# Patient Record
Sex: MALE | Race: OTHER | Employment: FULL TIME | URBAN - NONMETROPOLITAN AREA
[De-identification: names, ages, dates, MRNs, and addresses within clinical notes are randomized per-mention and may not be internally consistent; named-entity substitution may affect disease eponyms.]

---

## 2017-09-22 ENCOUNTER — HOSPITAL ENCOUNTER (OUTPATIENT)
Age: 32
Discharge: HOME | End: 2017-09-22

## 2017-09-22 ENCOUNTER — OFFICE VISIT (OUTPATIENT)
Dept: FAMILY MEDICINE CLINIC | Age: 32
End: 2017-09-22

## 2017-09-22 ENCOUNTER — HOSPITAL ENCOUNTER (OUTPATIENT)
Dept: GENERAL RADIOLOGY | Age: 32
Discharge: HOME | End: 2017-09-22

## 2017-09-22 VITALS
HEIGHT: 66 IN | WEIGHT: 240 LBS | DIASTOLIC BLOOD PRESSURE: 86 MMHG | SYSTOLIC BLOOD PRESSURE: 132 MMHG | BODY MASS INDEX: 38.57 KG/M2

## 2017-09-22 DIAGNOSIS — M25.552 LEFT HIP PAIN: ICD-10-CM

## 2017-09-22 DIAGNOSIS — M25.552 LEFT HIP PAIN: Primary | ICD-10-CM

## 2017-09-22 PROCEDURE — 73502 X-RAY EXAM HIP UNI 2-3 VIEWS: CPT

## 2017-09-22 PROCEDURE — 99202 OFFICE O/P NEW SF 15 MIN: CPT | Performed by: FAMILY MEDICINE

## 2017-09-22 ASSESSMENT — PATIENT HEALTH QUESTIONNAIRE - PHQ9
1. LITTLE INTEREST OR PLEASURE IN DOING THINGS: 0
2. FEELING DOWN, DEPRESSED OR HOPELESS: 0
SUM OF ALL RESPONSES TO PHQ9 QUESTIONS 1 & 2: 0
SUM OF ALL RESPONSES TO PHQ QUESTIONS 1-9: 0

## 2017-09-22 ASSESSMENT — ENCOUNTER SYMPTOMS
GASTROINTESTINAL NEGATIVE: 1
RESPIRATORY NEGATIVE: 1

## 2017-09-25 ENCOUNTER — TELEPHONE (OUTPATIENT)
Dept: FAMILY MEDICINE CLINIC | Age: 32
End: 2017-09-25

## 2017-09-25 DIAGNOSIS — Z87.39 HISTORY OF SLIPPED CAPITAL FEMORAL EPIPHYSIS (SCFE): ICD-10-CM

## 2017-09-25 DIAGNOSIS — M25.552 LEFT HIP PAIN: Primary | ICD-10-CM

## 2019-11-01 ENCOUNTER — APPOINTMENT (OUTPATIENT)
Dept: GENERAL RADIOLOGY | Age: 34
End: 2019-11-01

## 2019-11-01 ENCOUNTER — HOSPITAL ENCOUNTER (EMERGENCY)
Age: 34
Discharge: HOME | End: 2019-11-01
Attending: FAMILY MEDICINE

## 2019-11-01 VITALS
TEMPERATURE: 97.7 F | RESPIRATION RATE: 16 BRPM | HEART RATE: 62 BPM | DIASTOLIC BLOOD PRESSURE: 80 MMHG | WEIGHT: 236 LBS | BODY MASS INDEX: 39.32 KG/M2 | OXYGEN SATURATION: 100 % | HEIGHT: 65 IN | SYSTOLIC BLOOD PRESSURE: 169 MMHG

## 2019-11-01 DIAGNOSIS — M25.552 LEFT HIP PAIN: Primary | ICD-10-CM

## 2019-11-01 DIAGNOSIS — S70.02XA CONTUSION OF LEFT HIP, INITIAL ENCOUNTER: ICD-10-CM

## 2019-11-01 PROCEDURE — 99284 EMERGENCY DEPT VISIT MOD MDM: CPT

## 2019-11-01 PROCEDURE — 73502 X-RAY EXAM HIP UNI 2-3 VIEWS: CPT

## 2019-11-01 RX ORDER — NAPROXEN 500 MG/1
500 TABLET ORAL 2 TIMES DAILY
Qty: 20 TABLET | Refills: 1 | Status: SHIPPED | OUTPATIENT
Start: 2019-11-01 | End: 2020-03-15

## 2019-11-01 ASSESSMENT — PAIN DESCRIPTION - ORIENTATION: ORIENTATION: LEFT

## 2019-11-01 ASSESSMENT — PAIN DESCRIPTION - FREQUENCY: FREQUENCY: CONTINUOUS

## 2019-11-01 ASSESSMENT — PAIN SCALES - GENERAL: PAINLEVEL_OUTOF10: 8

## 2019-11-01 ASSESSMENT — PAIN DESCRIPTION - PAIN TYPE: TYPE: ACUTE PAIN

## 2019-11-01 ASSESSMENT — PAIN DESCRIPTION - ONSET: ONSET: ON-GOING

## 2019-11-01 ASSESSMENT — PAIN DESCRIPTION - LOCATION: LOCATION: HIP

## 2019-11-01 ASSESSMENT — PAIN DESCRIPTION - DESCRIPTORS: DESCRIPTORS: ACHING

## 2020-03-15 ENCOUNTER — HOSPITAL ENCOUNTER (EMERGENCY)
Age: 35
Discharge: HOME | End: 2020-03-15
Attending: EMERGENCY MEDICINE
Payer: COMMERCIAL

## 2020-03-15 ENCOUNTER — APPOINTMENT (OUTPATIENT)
Dept: GENERAL RADIOLOGY | Age: 35
End: 2020-03-15
Payer: COMMERCIAL

## 2020-03-15 VITALS
RESPIRATION RATE: 16 BRPM | TEMPERATURE: 98.8 F | OXYGEN SATURATION: 100 % | HEART RATE: 80 BPM | WEIGHT: 239 LBS | BODY MASS INDEX: 38.41 KG/M2 | HEIGHT: 66 IN | SYSTOLIC BLOOD PRESSURE: 148 MMHG | DIASTOLIC BLOOD PRESSURE: 74 MMHG

## 2020-03-15 LAB
-: NORMAL
ABSOLUTE EOS #: 0.1 K/UL (ref 0–0.4)
ABSOLUTE IMMATURE GRANULOCYTE: ABNORMAL K/UL (ref 0–0.3)
ABSOLUTE LYMPH #: 0.6 K/UL (ref 1–4.8)
ABSOLUTE MONO #: 0.6 K/UL (ref 0–1)
ALBUMIN SERPL-MCNC: 4.5 G/DL (ref 3.5–5.2)
ALBUMIN/GLOBULIN RATIO: ABNORMAL (ref 1–2.5)
ALP BLD-CCNC: 89 U/L (ref 40–129)
ALT SERPL-CCNC: 31 U/L (ref 5–41)
AMORPHOUS: NORMAL
ANION GAP SERPL CALCULATED.3IONS-SCNC: 13 MMOL/L (ref 9–17)
AST SERPL-CCNC: 20 U/L
BACTERIA: NORMAL
BASOPHILS # BLD: 0 % (ref 0–2)
BASOPHILS ABSOLUTE: 0 K/UL (ref 0–0.2)
BILIRUB SERPL-MCNC: 0.34 MG/DL (ref 0.3–1.2)
BILIRUBIN URINE: NEGATIVE
BUN BLDV-MCNC: 10 MG/DL (ref 6–20)
BUN/CREAT BLD: 15 (ref 9–20)
CALCIUM SERPL-MCNC: 9.6 MG/DL (ref 8.6–10.4)
CASTS UA: NORMAL /LPF
CHLORIDE BLD-SCNC: 101 MMOL/L (ref 98–107)
CO2: 22 MMOL/L (ref 20–31)
COLOR: YELLOW
COMMENT UA: ABNORMAL
CREAT SERPL-MCNC: 0.66 MG/DL (ref 0.7–1.2)
CRYSTALS, UA: NORMAL /HPF
DIFFERENTIAL TYPE: YES
DIRECT EXAM: NORMAL
DIRECT EXAM: NORMAL
EOSINOPHILS RELATIVE PERCENT: 1 % (ref 0–5)
EPITHELIAL CELLS UA: NORMAL /HPF
GFR AFRICAN AMERICAN: >60 ML/MIN
GFR NON-AFRICAN AMERICAN: >60 ML/MIN
GFR SERPL CREATININE-BSD FRML MDRD: ABNORMAL ML/MIN/{1.73_M2}
GFR SERPL CREATININE-BSD FRML MDRD: ABNORMAL ML/MIN/{1.73_M2}
GLUCOSE BLD-MCNC: 132 MG/DL (ref 70–99)
GLUCOSE URINE: NEGATIVE
HCT VFR BLD CALC: 48.5 % (ref 41–53)
HEMOGLOBIN: 16.3 G/DL (ref 13.5–17.5)
IMMATURE GRANULOCYTES: ABNORMAL %
KETONES, URINE: NEGATIVE
LEUKOCYTE ESTERASE, URINE: NEGATIVE
LIPASE: 18 U/L (ref 13–60)
LYMPHOCYTES # BLD: 8 % (ref 13–44)
Lab: NORMAL
MCH RBC QN AUTO: 30.3 PG (ref 26–34)
MCHC RBC AUTO-ENTMCNC: 33.5 G/DL (ref 31–37)
MCV RBC AUTO: 90.3 FL (ref 80–100)
MONOCYTES # BLD: 8 % (ref 5–9)
MUCUS: NORMAL
NITRITE, URINE: NEGATIVE
NRBC AUTOMATED: ABNORMAL PER 100 WBC
OTHER OBSERVATIONS UA: NORMAL
PDW BLD-RTO: 13 % (ref 12.1–15.2)
PH UA: 7 (ref 5–8)
PLATELET # BLD: 228 K/UL (ref 140–450)
PLATELET ESTIMATE: ABNORMAL
PMV BLD AUTO: ABNORMAL FL (ref 6–12)
POTASSIUM SERPL-SCNC: 4.3 MMOL/L (ref 3.7–5.3)
PROTEIN UA: ABNORMAL
RBC # BLD: 5.37 M/UL (ref 4.5–5.9)
RBC # BLD: ABNORMAL 10*6/UL
RBC UA: NORMAL /HPF (ref 0–2)
RENAL EPITHELIAL, UA: NORMAL /HPF
SEG NEUTROPHILS: 83 % (ref 39–75)
SEGMENTED NEUTROPHILS ABSOLUTE COUNT: 6.9 K/UL (ref 2.1–6.5)
SODIUM BLD-SCNC: 136 MMOL/L (ref 135–144)
SPECIFIC GRAVITY UA: 1.01 (ref 1–1.03)
SPECIMEN DESCRIPTION: NORMAL
TOTAL PROTEIN: 8.2 G/DL (ref 6.4–8.3)
TRICHOMONAS: NORMAL
TROPONIN INTERP: NORMAL
TROPONIN T: <0.03 NG/ML
TROPONIN, HIGH SENSITIVITY: NORMAL NG/L (ref 0–22)
TURBIDITY: CLEAR
URINE HGB: ABNORMAL
UROBILINOGEN, URINE: NORMAL
WBC # BLD: 8.3 K/UL (ref 3.5–11)
WBC # BLD: ABNORMAL 10*3/UL
WBC UA: NORMAL /HPF
YEAST: NORMAL

## 2020-03-15 PROCEDURE — 80053 COMPREHEN METABOLIC PANEL: CPT

## 2020-03-15 PROCEDURE — 71046 X-RAY EXAM CHEST 2 VIEWS: CPT

## 2020-03-15 PROCEDURE — 6360000002 HC RX W HCPCS: Performed by: EMERGENCY MEDICINE

## 2020-03-15 PROCEDURE — 84484 ASSAY OF TROPONIN QUANT: CPT

## 2020-03-15 PROCEDURE — 81001 URINALYSIS AUTO W/SCOPE: CPT

## 2020-03-15 PROCEDURE — 83690 ASSAY OF LIPASE: CPT

## 2020-03-15 PROCEDURE — 87804 INFLUENZA ASSAY W/OPTIC: CPT

## 2020-03-15 PROCEDURE — 36415 COLL VENOUS BLD VENIPUNCTURE: CPT

## 2020-03-15 PROCEDURE — 2580000003 HC RX 258: Performed by: EMERGENCY MEDICINE

## 2020-03-15 PROCEDURE — 96374 THER/PROPH/DIAG INJ IV PUSH: CPT

## 2020-03-15 PROCEDURE — 99285 EMERGENCY DEPT VISIT HI MDM: CPT

## 2020-03-15 PROCEDURE — 85025 COMPLETE CBC W/AUTO DIFF WBC: CPT

## 2020-03-15 RX ORDER — 0.9 % SODIUM CHLORIDE 0.9 %
1000 INTRAVENOUS SOLUTION INTRAVENOUS ONCE
Status: COMPLETED | OUTPATIENT
Start: 2020-03-15 | End: 2020-03-15

## 2020-03-15 RX ORDER — ONDANSETRON 4 MG/1
4 TABLET, ORALLY DISINTEGRATING ORAL EVERY 8 HOURS PRN
Qty: 20 TABLET | Refills: 0 | Status: SHIPPED | OUTPATIENT
Start: 2020-03-15 | End: 2020-03-22

## 2020-03-15 RX ORDER — AZITHROMYCIN 250 MG/1
TABLET, FILM COATED ORAL
Qty: 1 PACKET | Refills: 0 | Status: SHIPPED | OUTPATIENT
Start: 2020-03-15 | End: 2020-03-19

## 2020-03-15 RX ORDER — ONDANSETRON 2 MG/ML
4 INJECTION INTRAMUSCULAR; INTRAVENOUS ONCE
Status: COMPLETED | OUTPATIENT
Start: 2020-03-15 | End: 2020-03-15

## 2020-03-15 RX ORDER — ALBUTEROL SULFATE 90 UG/1
2 AEROSOL, METERED RESPIRATORY (INHALATION) EVERY 6 HOURS PRN
Qty: 1 INHALER | Refills: 3 | Status: SHIPPED | OUTPATIENT
Start: 2020-03-15

## 2020-03-15 RX ADMIN — ONDANSETRON 4 MG: 2 INJECTION INTRAMUSCULAR; INTRAVENOUS at 10:19

## 2020-03-15 RX ADMIN — SODIUM CHLORIDE 1000 ML: 9 INJECTION, SOLUTION INTRAVENOUS at 10:19

## 2020-03-15 ASSESSMENT — PAIN SCALES - GENERAL: PAINLEVEL_OUTOF10: 7

## 2020-03-15 ASSESSMENT — PAIN DESCRIPTION - LOCATION: LOCATION: ABDOMEN;GENERALIZED

## 2020-03-15 ASSESSMENT — PAIN DESCRIPTION - PAIN TYPE: TYPE: ACUTE PAIN

## 2020-03-15 NOTE — LETTER
[facility] ED  [address] [address]  Phone: [phone]               March 15, 2020    Patient: Basil Kate. YOB: 1985   Date of Visit: 3/15/2020       To Whom It May Concern:    Brittany Sosa was seen and treated in our emergency department on 3/15/2020. He may return to work on 18 March 2020.       Sincerely,       Danya Muñiz RN         Signature:__________________________________

## 2020-03-17 NOTE — ED PROVIDER NOTES
----- Message from Lance Cordero sent at 7/5/2018 11:44 AM EDT -----  Regarding: Dr [facility]: [phone]  Anny is calling about the pump order. He is calling to say everything is ready to go ahead.   Please call dad back at:      Anny Cell # [phone] within normal limits   URINALYSIS - Abnormal; Notable for the following components:    Urine Hgb TRACE (*)     Protein, UA TRACE (*)     All other components within normal limits   RAPID INFLUENZA A/B ANTIGENS   LIPASE   TROPONIN   MICROSCOPIC URINALYSIS             Summation      Patient Course:     ED Medications administered this visit:    Medications   0.9 % sodium chloride bolus (0 mLs Intravenous Stopped 3/15/20 1102)   ondansetron (ZOFRAN) injection 4 mg (4 mg Intravenous Given 3/15/20 1019)       New Prescriptions from this visit:    Discharge Medication List as of 3/15/2020 10:42 AM      START taking these medications    Details   ondansetron (ZOFRAN-ODT) 4 MG disintegrating tablet Place 1 tablet under the tongue every 8 hours as needed for Nausea or Vomiting May Sub regular tablet (non-ODT) if insurance does not cover ODT., Disp-20 tablet, R-0Print      albuterol sulfate HFA (PROVENTIL HFA) 108 (90 Base) MCG/ACT inhaler Inhale 2 puffs into the lungs every 6 hours as needed for Wheezing, Disp-1 Inhaler, R-3Print      azithromycin (ZITHROMAX Z-DANAE) 250 MG tablet Take 2 tablets (500 mg) on Day 1, and then take 1 tablet (250 mg) on days 2 through 5., Disp-1 packet, R-0Print             Follow-up:  Brandy Guerrero DO  [address] [phone]    Call in 1 day          Final Impression:   1. Nausea and vomiting, intractability of vomiting not specified, unspecified vomiting type    2. Bronchitis    3.  Smoking               (Please note that portions of this note were completed with a voice recognition program.  Efforts were made to edit the dictations but occasionally words are mis-transcribed.)            Jaylan Randle MD  03/17/20 [id number]

## 2020-09-30 ENCOUNTER — HOSPITAL ENCOUNTER (EMERGENCY)
Age: 35
Discharge: HOME | End: 2020-09-30
Attending: EMERGENCY MEDICINE

## 2020-09-30 VITALS
DIASTOLIC BLOOD PRESSURE: 84 MMHG | OXYGEN SATURATION: 94 % | WEIGHT: 233 LBS | SYSTOLIC BLOOD PRESSURE: 158 MMHG | RESPIRATION RATE: 18 BRPM | BODY MASS INDEX: 38.82 KG/M2 | TEMPERATURE: 97.7 F | HEART RATE: 70 BPM | HEIGHT: 65 IN

## 2020-09-30 PROCEDURE — 99283 EMERGENCY DEPT VISIT LOW MDM: CPT

## 2020-09-30 PROCEDURE — 99282 EMERGENCY DEPT VISIT SF MDM: CPT

## 2020-09-30 ASSESSMENT — PAIN DESCRIPTION - DESCRIPTORS: DESCRIPTORS: ACHING

## 2020-09-30 ASSESSMENT — PAIN SCALES - GENERAL: PAINLEVEL_OUTOF10: 9

## 2020-09-30 ASSESSMENT — PAIN DESCRIPTION - PAIN TYPE: TYPE: ACUTE PAIN

## 2020-09-30 ASSESSMENT — PAIN DESCRIPTION - ORIENTATION: ORIENTATION: LEFT

## 2020-09-30 ASSESSMENT — PAIN DESCRIPTION - LOCATION: LOCATION: LEG

## 2020-09-30 NOTE — LETTER
[facility] ED  [address], [address]  Phone: [phone]               September 30, 2020    Patient: Saran Cancino. YOB: 1985   Date of Visit: 9/30/2020       To Whom It May Concern:    Naomia Opitz was seen and treated in our emergency department on 9/30/2020. He may return to work on 10/02/2020.       Sincerely,       Teto Escobar RN         Signature:__________________________________

## 2020-09-30 NOTE — ED PROVIDER NOTES
eMERGENCY dEPARTMENT eNCOUnter        [address] [facility]    Chief Complaint   Patient presents with    Leg Pain     Pt fell in the shower yesterday morning, pt worked all day yesterday and now having increased pain to left leg. HPI    Angely Harmon is a 36 y.o. male who presents to ED from home. By car. With complaint of left buttock pain with radiation to his lower back. Onset since yesterday. Patient states that he fell in the shower yesterday. Patient was able to ambulate. Patient work for most of his shift yesterday. Patient has pain with walking. Intensity of symptoms moderate pain with walking. Location of symptoms left posterior hip. Patient is able to ambulate. Patient has prior history of left hip surgery. REVIEW OF SYSTEMS    All systems reviewed and positives are in the HPI      PAST MEDICAL HISTORY    History reviewed. No pertinent past medical history. SURGICAL HISTORY    Past Surgical History:   Procedure Laterality Date    CAUTERIZE INNER NOSE Left     HERNIA REPAIR      HIP SURGERY Left     pinning       CURRENT MEDICATIONS    Current Outpatient Rx   Medication Sig Dispense Refill    albuterol sulfate HFA (PROVENTIL HFA) 108 (90 Base) MCG/ACT inhaler Inhale 2 puffs into the lungs every 6 hours as needed for Wheezing 1 Inhaler 3       ALLERGIES    Allergies   Allergen Reactions    Vicodin [Hydrocodone-Acetaminophen] Other (See Comments)     Upset stomach       FAMILY HISTORY    History reviewed. No pertinent family history.     SOCIAL HISTORY    Social History     Socioeconomic History    Marital status: Single     Spouse name: None    Number of children: None    Years of education: None    Highest education level: None   Occupational History    None   Social Needs    Financial resource strain: None    Food insecurity     Worry: None     Inability: None    Transportation needs     Medical: None     Non-medical: None   Tobacco Use    Smoking status: Current Every Day Smoker     Packs/day: 1.00     Types: Cigarettes    Smokeless tobacco: Former User     Types: Chew   Substance and Sexual Activity    Alcohol use: No    Drug use: Not Currently    Sexual activity: None   Lifestyle    Physical activity     Days per week: None     Minutes per session: None    Stress: None   Relationships    Social connections     Talks on phone: None     Gets together: None     Attends Baptism service: None     Active member of club or organization: None     Attends meetings of clubs or organizations: None     Relationship status: None    Intimate partner violence     Fear of current or ex partner: None     Emotionally abused: None     Physically abused: None     Forced sexual activity: None   Other Topics Concern    None   Social History Narrative    ** Merged History Encounter **            PHYSICAL EXAM    VITAL SIGNS: BP (!) 158/84   Pulse 70   Temp 97.7 °F (36.5 °C) (Oral)   Resp 18   Ht 5' 5\" (1.651 m)   Wt 233 lb (105.7 kg)   SpO2 94%   BMI 38.77 kg/m²   Constitutional:  Well developed, well nourished, no acute distress, non-toxic appearance   HENT:  Atraumatic, external ears normal, nose normal, oropharynx moist.  Neck- normal range of motion, no tenderness, supple   Respiratory:  No respiratory distress, normal breath sounds. Cardiovascular:  Normal rate, normal rhythm, no murmurs, no gallops, no rubs   GI:  Soft, nondistended, normal bowel sounds, nontender   Musculoskeletal: Left hip with good range of motion no deformity. No ecchymosis. No swelling. Not warm to touch. Integument:  Well hydrated, no rash   Neurologic: Alert and oriented x3. RADIOLOGY/PROCEDURES    No orders to display         Labs  Labs Reviewed - No data to display          Summation      Patient Course: Patient will be sent home. Rest is recommended. X-rays were discussed with the patient. At this point the patient will be sent home rest is recommended.   Physical activity as tolerated. Off work x2 days. Follow-up with Ortho for persistent symptoms. Return to ED if worse. ED Medications administered this visit:  Medications - No data to display    New Prescriptions from this visit:    New Prescriptions    No medications on file       Follow-up:  [address] [facility]  Schedule an appointment as soon as possible for a visit in 2 days  As needed, If symptoms worsen        Final Impression:   1.  Contusion of left hip, initial encounter               (Please note that portions of this note were completed with a voice recognition program.  Efforts were made to edit the dictations but occasionally words are mis-transcribed.)          Vijay Ontiveros MD  09/30/20 2135

## 2020-10-27 ENCOUNTER — OFFICE VISIT (OUTPATIENT)
Dept: FAMILY MEDICINE CLINIC | Age: 35
End: 2020-10-27
Payer: COMMERCIAL

## 2020-10-27 VITALS
OXYGEN SATURATION: 98 % | HEART RATE: 69 BPM | BODY MASS INDEX: 38.44 KG/M2 | TEMPERATURE: 97.6 F | WEIGHT: 231 LBS | SYSTOLIC BLOOD PRESSURE: 146 MMHG | DIASTOLIC BLOOD PRESSURE: 90 MMHG

## 2020-10-27 PROCEDURE — 99203 OFFICE O/P NEW LOW 30 MIN: CPT | Performed by: INTERNAL MEDICINE

## 2020-10-27 RX ORDER — MELOXICAM 15 MG/1
15 TABLET ORAL DAILY
Qty: 90 TABLET | Refills: 1 | Status: SHIPPED | OUTPATIENT
Start: 2020-10-27

## 2020-10-27 RX ORDER — METHOCARBAMOL 750 MG/1
TABLET, FILM COATED ORAL
COMMUNITY
Start: 2020-10-21

## 2020-10-27 RX ORDER — METHYLPREDNISOLONE 4 MG/1
TABLET ORAL
COMMUNITY
Start: 2020-10-21

## 2020-10-27 SDOH — ECONOMIC STABILITY: FOOD INSECURITY: WITHIN THE PAST 12 MONTHS, THE FOOD YOU BOUGHT JUST DIDN'T LAST AND YOU DIDN'T HAVE MONEY TO GET MORE.: NEVER TRUE

## 2020-10-27 SDOH — ECONOMIC STABILITY: FOOD INSECURITY: WITHIN THE PAST 12 MONTHS, YOU WORRIED THAT YOUR FOOD WOULD RUN OUT BEFORE YOU GOT MONEY TO BUY MORE.: NEVER TRUE

## 2020-10-27 SDOH — ECONOMIC STABILITY: TRANSPORTATION INSECURITY
IN THE PAST 12 MONTHS, HAS LACK OF TRANSPORTATION KEPT YOU FROM MEETINGS, WORK, OR FROM GETTING THINGS NEEDED FOR DAILY LIVING?: NO

## 2020-10-27 SDOH — ECONOMIC STABILITY: INCOME INSECURITY: HOW HARD IS IT FOR YOU TO PAY FOR THE VERY BASICS LIKE FOOD, HOUSING, MEDICAL CARE, AND HEATING?: NOT VERY HARD

## 2020-10-27 SDOH — ECONOMIC STABILITY: TRANSPORTATION INSECURITY
IN THE PAST 12 MONTHS, HAS THE LACK OF TRANSPORTATION KEPT YOU FROM MEDICAL APPOINTMENTS OR FROM GETTING MEDICATIONS?: NO

## 2020-10-27 ASSESSMENT — PATIENT HEALTH QUESTIONNAIRE - PHQ9
SUM OF ALL RESPONSES TO PHQ QUESTIONS 1-9: 0
1. LITTLE INTEREST OR PLEASURE IN DOING THINGS: 0
2. FEELING DOWN, DEPRESSED OR HOPELESS: 0
SUM OF ALL RESPONSES TO PHQ9 QUESTIONS 1 & 2: 0
SUM OF ALL RESPONSES TO PHQ QUESTIONS 1-9: 0
SUM OF ALL RESPONSES TO PHQ QUESTIONS 1-9: 0

## 2020-10-27 ASSESSMENT — ENCOUNTER SYMPTOMS
SORE THROAT: 0
COUGH: 0
CONSTIPATION: 0
SHORTNESS OF BREATH: 0
ABDOMINAL PAIN: 0
ALLERGIC/IMMUNOLOGIC NEGATIVE: 1
NAUSEA: 0
WHEEZING: 0
BACK PAIN: 1
BLOOD IN STOOL: 0

## 2020-10-27 NOTE — PROGRESS NOTES
HPI Notes    Name: Abel Parker. : 1985         Chief Complaint:     Chief Complaint   Patient presents with    Follow-Up from Hospital     Patient presents today for a follow up for ER visit with left leg pain.  New Patient     Patient is new to provider. History of Present Illness:        Mr. Mauricio Troncoso is a new patient. He presents to office for evaluation of chronic pain in the left hip/lower back. He went to [facility] on 10/21/20 due to worsening worsening pain in the left hip. Had x-ray of the left hip revealing: There is old deformity of the left femoral neck, and there is a metallic surgical screw traversing the femoral neck and ending in the femoral head. There is slight narrowing of the left hip joint space superiorly and laterally with minimal marginal hypertrophic lipping. The bones of the left hip are otherwise unremarkable. No recent fracture no dislocation is evident. The bony pelvis is unremarkable. Patient also was in [facility] ER on 20. No imaging studies were done. Was discharged home and advised to take OTC pain meds. Tried Ibuprofen 400 mg twice daily. . Initially it helped, but then it stopped working. No recent falls  Works at [employer] as a . States unable to bend due to the pain in the left hip and lower back area. Has very limited range of motion in the left hip and lower back. Reports  h/o left hip surgery at age 7 or 6 with a placement of a screw. He is not sure of his diagnosis. Since then has not followed up with orthopedic surgery. States has been having intermittent pains in his left hip but they usually resolve. Recently the pain has been more frequent and lasting longer. Hip Pain    The incident occurred more than 1 week ago. The incident occurred at home. There was no injury mechanism. The pain is present in the left hip. The quality of the pain is described as stabbing and aching.  The pain is at a severity of 10/10. The pain has been constant since onset. Associated symptoms include numbness (left leg) and tingling. He reports no foreign bodies present. He has tried NSAIDs, heat and immobilization for the symptoms. The treatment provided mild relief. Past Medical History:     History reviewed. No pertinent past medical history. Reviewed all health maintenance requirements and orderedappropriate tests  Health Maintenance Due   Topic Date Due    Varicella vaccine (1 of 2 - 2-dose childhood series) 12/21/1987    Pneumococcal 0-64 years Vaccine (1 of 1 - PPSV23) 12/21/1992    HIV screen  12/21/2001    DTaP/Tdap/Td vaccine (1 - Tdap) 12/21/2005    Flu vaccine (1) 09/01/2020       Past Surgical History:     Past Surgical History:   Procedure Laterality Date    CAUTERIZE INNER NOSE Left     HERNIA REPAIR      HIP SURGERY Left     pinning        Medications:       Prior to Admission medications    Medication Sig Start Date End Date Taking? Authorizing Provider   methocarbamol (ROBAXIN) 750 MG tablet TAKE 1 TABLET BY MOUTH THREE TIMES DAILY FOR 7 DAYS 10/21/20  Yes Historical Provider, MD   methylPREDNISolone (MEDROL DOSEPACK) 4 MG tablet USE AS DIRECTED ON PACKAGE LABELING 10/21/20  Yes Historical Provider, MD   meloxicam (MOBIC) 15 MG tablet Take 1 tablet by mouth daily 10/27/20  Yes Javier Albarran MD   albuterol sulfate HFA (PROVENTIL HFA) 108 (90 Base) MCG/ACT inhaler Inhale 2 puffs into the lungs every 6 hours as needed for Wheezing  Patient not taking: Reported on 10/27/2020 3/15/20   Rupa Ramsey MD        Allergies:       Vicodin [hydrocodone-acetaminophen]    Social History:     Tobacco: reports that he has been smoking cigarettes. He has been smoking about 1.00 pack per day. He has quit using smokeless tobacco.  His smokeless tobacco use included chew. Alcohol:      reports no history of alcohol use. Drug Use:  reports previous drug use. Family History:     History reviewed.  No pertinent family history. Review of Systems:         Review of Systems   Constitutional: Negative for activity change, appetite change and unexpected weight change. HENT: Negative for congestion, ear discharge, ear pain and sore throat. Eyes: Negative for visual disturbance. Respiratory: Negative for cough, shortness of breath and wheezing. Cardiovascular: Negative for chest pain, palpitations and leg swelling. Gastrointestinal: Negative for abdominal pain, blood in stool, constipation and nausea. Endocrine: Negative for cold intolerance, heat intolerance, polydipsia and polyuria. Genitourinary: Negative for difficulty urinating and dysuria. Musculoskeletal: Positive for arthralgias and back pain. Negative for joint swelling, myalgias and neck pain. Skin: Negative for rash. Allergic/Immunologic: Negative. Neurological: Positive for tingling and numbness (left leg). Negative for dizziness, tremors, weakness and headaches. Psychiatric/Behavioral: Negative for behavioral problems and dysphoric mood. The patient is not nervous/anxious. Physical Exam:     Vitals:  BP (!) 146/90 (Site: Left Upper Arm, Position: Sitting, Cuff Size: Large Adult)   Pulse 69   Temp 97.6 °F (36.4 °C) (Temporal)   Wt 231 lb (104.8 kg)   SpO2 98%   BMI 38.44 kg/m²       Physical Exam  Vitals signs reviewed. Constitutional:       General: He is not in acute distress. Appearance: He is well-developed. He is obese. He is not ill-appearing. HENT:      Head: Normocephalic and atraumatic. Right Ear: Tympanic membrane, ear canal and external ear normal.      Left Ear: Tympanic membrane, ear canal and external ear normal.      Nose: Nose normal. No congestion. Mouth/Throat:      Mouth: Mucous membranes are moist.      Pharynx: Oropharynx is clear. Eyes:      General: No scleral icterus. Right eye: No discharge. Left eye: No discharge.       Conjunctiva/sclera: Conjunctivae normal.   Neck:      Thyroid: No thyromegaly. Cardiovascular:      Rate and Rhythm: Normal rate and regular rhythm. Heart sounds: Normal heart sounds. No murmur. Pulmonary:      Effort: Pulmonary effort is normal.      Breath sounds: Normal breath sounds. No wheezing or rales. Abdominal:      General: Bowel sounds are normal. There is no distension. Palpations: Abdomen is soft. There is no mass. Tenderness: There is no abdominal tenderness. Musculoskeletal: Normal range of motion. Right lower leg: No edema. Left lower leg: No edema. Comments:   Gait is slow but steady. Has tenderness on palpation over the lateral hip area. Unable to bend forward due to experiencing pain in the left hip. No tenderness present on palpation of the lumbosacral area. Lymphadenopathy:      Cervical: No cervical adenopathy. Skin:     General: Skin is warm and dry. Coloration: Skin is not jaundiced or pale. Findings: No rash. Neurological:      General: No focal deficit present. Mental Status: He is alert and oriented to person, place, and time. Mental status is at baseline.       Coordination: Coordination normal.   Psychiatric:         Behavior: Behavior normal.         Judgment: Judgment normal.               Data:     Lab Results   Component Value Date     03/15/2020    K 4.3 03/15/2020     03/15/2020    CO2 22 03/15/2020    BUN 10 03/15/2020    CREATININE 0.66 03/15/2020    GLUCOSE 132 03/15/2020    PROT 8.2 03/15/2020    LABALBU 4.5 03/15/2020    BILITOT 0.34 03/15/2020    ALKPHOS 89 03/15/2020    AST 20 03/15/2020    ALT 31 03/15/2020     Lab Results   Component Value Date    WBC 8.3 03/15/2020    RBC 5.37 03/15/2020    HGB 16.3 03/15/2020    HCT 48.5 03/15/2020    MCV 90.3 03/15/2020    MCH 30.3 03/15/2020    MCHC 33.5 03/15/2020    RDW 13.0 03/15/2020     03/15/2020    MPV NOT REPORTED 03/15/2020     No results found for: TSH  No results found for: CHOL, HDL, PSA, LABA1C       Assessment & Plan        Diagnosis Orders   1. Left hip pain  meloxicam (MOBIC) 15 MG tablet   2. History of hip surgery  meloxicam (MOBIC) 15 MG tablet    External Referral To Orthopedic Surgery     Prescribed meloxicam 15 mg daily. Will refer to orthopedic surgeon for evaluation of possible hardware problems in the left hip and joint instability. Advised to avoid lifting heavy boxes, will provide with work excuse until evaluated by orthopedic surgeon. Completed Refills   Requested Prescriptions     Signed Prescriptions Disp Refills    meloxicam (MOBIC) 15 MG tablet 90 tablet 1     Sig: Take 1 tablet by mouth daily     Return if symptoms worsen or fail to improve. Orders Placed This Encounter   Medications    meloxicam (MOBIC) 15 MG tablet     Sig: Take 1 tablet by mouth daily     Dispense:  90 tablet     Refill:  1     Orders Placed This Encounter   Procedures    External Referral To Orthopedic Surgery     Referral Priority:   Routine     Referral Type:   Eval and Treat     Referral Reason:   Specialty Services Required     Referred to Provider:   Matt Cadena MD     Requested Specialty:   Orthopedic Surgery     Number of Visits Requested:   1         Patient Instructions   SURVEY:    You may be receiving a survey from [employer] regarding your visit today. Please complete the survey to enable us to provide the highest quality of care to you and your family. If you cannot score us a very good on any question, please call the office to discuss how we could of made your experience a very good one. Thank you. You may be receiving a survey from [employer] regarding your visit today. You may get this in the mail, through your MyChart or in your email. Please complete the survey to enable us to provide the highest quality of care to you and your family.     If you cannot score us as very good ( 5 Stars) on any question, please feel free to call the office to discuss how we could have made your experience exceptional.     Thank You!       MD Dixie Thompson RMA  Ignacio Bologna, PCA        Electronically signed by Carole Guajardo MD on 10/27/2020 at 12:35 PM           Completed Refills      Requested Prescriptions     Signed Prescriptions Disp Refills    meloxicam (MOBIC) 15 MG tablet 90 tablet 1     Sig: Take 1 tablet by mouth daily

## 2020-10-27 NOTE — PATIENT INSTRUCTIONS
SURVEY:    You may be receiving a survey from [employer] regarding your visit today. Please complete the survey to enable us to provide the highest quality of care to you and your family. If you cannot score us a very good on any question, please call the office to discuss how we could of made your experience a very good one. Thank you. You may be receiving a survey from [employer] regarding your visit today. You may get this in the mail, through your MyChart or in your email. Please complete the survey to enable us to provide the highest quality of care to you and your family. If you cannot score us as very good ( 5 Stars) on any question, please feel free to call the office to discuss how we could have made your experience exceptional.     Thank You!       MD Dee Hernandez, DAVID Park, PCA

## 2020-10-30 ENCOUNTER — HOSPITAL ENCOUNTER (OUTPATIENT)
Dept: GENERAL RADIOLOGY | Age: 35
Discharge: HOME | End: 2020-11-01
Payer: COMMERCIAL

## 2020-10-30 ENCOUNTER — HOSPITAL ENCOUNTER (OUTPATIENT)
Age: 35
Discharge: HOME | End: 2020-11-01
Payer: COMMERCIAL

## 2020-10-30 PROCEDURE — 72114 X-RAY EXAM L-S SPINE BENDING: CPT

## 2020-10-30 PROCEDURE — 73502 X-RAY EXAM HIP UNI 2-3 VIEWS: CPT

## 2022-06-07 ENCOUNTER — OFFICE VISIT (OUTPATIENT)
Dept: PRIMARY CARE CLINIC | Age: 37
End: 2022-06-07
Payer: COMMERCIAL

## 2022-06-07 VITALS
BODY MASS INDEX: 39.94 KG/M2 | HEART RATE: 66 BPM | DIASTOLIC BLOOD PRESSURE: 82 MMHG | OXYGEN SATURATION: 98 % | WEIGHT: 240 LBS | TEMPERATURE: 98 F | SYSTOLIC BLOOD PRESSURE: 132 MMHG

## 2022-06-07 DIAGNOSIS — R19.7 ABDOMINAL PAIN, VOMITING, AND DIARRHEA: ICD-10-CM

## 2022-06-07 DIAGNOSIS — R11.10 ABDOMINAL PAIN, VOMITING, AND DIARRHEA: ICD-10-CM

## 2022-06-07 DIAGNOSIS — R10.9 ABDOMINAL PAIN, VOMITING, AND DIARRHEA: ICD-10-CM

## 2022-06-07 DIAGNOSIS — K52.9 GASTROENTERITIS: Primary | ICD-10-CM

## 2022-06-07 PROBLEM — F17.200 CURRENT SMOKER: Status: ACTIVE | Noted: 2022-06-07

## 2022-06-07 PROBLEM — K40.90 INGUINAL HERNIA: Status: ACTIVE | Noted: 2022-06-07

## 2022-06-07 LAB
BILIRUBIN, POC: NEGATIVE
BLOOD URINE, POC: NEGATIVE
CLARITY, POC: CLEAR
COLOR, POC: YELLOW
GLUCOSE URINE, POC: NEGATIVE
INFLUENZA A ANTIBODY: NEGATIVE
INFLUENZA B ANTIBODY: NEGATIVE
KETONES, POC: NEGATIVE
LEUKOCYTE EST, POC: NEGATIVE
NITRITE, POC: NEGATIVE
PH, POC: 7
PROTEIN, POC: NEGATIVE
SPECIFIC GRAVITY, POC: 1.02
UROBILINOGEN, POC: 0.2

## 2022-06-07 PROCEDURE — 99213 OFFICE O/P EST LOW 20 MIN: CPT | Performed by: NURSE PRACTITIONER

## 2022-06-07 PROCEDURE — 81002 URINALYSIS NONAUTO W/O SCOPE: CPT | Performed by: NURSE PRACTITIONER

## 2022-06-07 PROCEDURE — 87804 INFLUENZA ASSAY W/OPTIC: CPT | Performed by: NURSE PRACTITIONER

## 2022-06-07 NOTE — PROGRESS NOTES
Chief Complaint:       Nausea & Vomiting (Symptoms started yesterday) and Diarrhea      History of Present Illness   Source of history provided by:  patient. Laura Lu is a 29 y.o. old male who has a past medical history of: No past medical history on file. Presents to the walk in clinic for complaints of nausea and vomiting and diarrhea that began yesterday. According to Ryann Veloz, he is concerned that he may have eaten a bad sandwich at work yesterday. He reports that he consumed a sandwich around 1630 yesterday at work from [employer] that consisted of a hot dog bun, ham and a packet of mayonnaise. He began to feel bad approximately 1-1/2 hours later. According to Ryann Veloz, he had at least 2 emesis yesterday, denies any emesis today and has been able to retain apple juice and water. His last diarrheal stools this morning and reports altogether he has had approximately 6 episodes that awaken him through the night. There is been no blood or mucus in the diarrhea. He has been using over-the-counter antidiarrhea medication with good results. He denies fever, reports he may have had some chilling after the emesis began. Ryann Veloz reports that he has a sore abdomen and feels it is more likely due to sore muscles with emesis. He reports \"my stomach is sore as can be. Feels like it is just muscle pain from a vomiting. Like I did a bunch of crunches. It will start gurgling and cramping to\". He denies abdominal bloating, distention, pain with movement. No covid 19 concerns. Ryann Veloz also voices concern for recent episode of urinary frequency. He reports that have been a while ago and has improved. He reports that he was seeing his PCP for some left-sided sciatica pain that he is concerned he is now having in the right side but feels it is more up in the right back. There is been no injury. He denies any urinary urgency, dysuria, burning or blood in his urine.     Denies any CP, SOB, dysuria, hematuria,  melena, hematemesis, coffee-ground emesis, HA, sore throat, rash, or lethargy. ROS   Unless otherwise stated in this report or unable to obtain because of the patient's clinical or mental status as evidenced by the medical record, this patients's positive and negative responses for Review of Systems, constitutional, psych, eyes, ENT, cardiovascular, respiratory, gastrointestinal, neurological, genitourinary, musculoskeletal, integument systems and systems related to the presenting problem are either stated in the preceding or were not pertinent or were negative for the symptoms and/or complaints related to the medical problem. Past Medical History:   Past Surgical History:   Procedure Laterality Date    CAUTERIZE INNER NOSE Left     HERNIA REPAIR      HIP SURGERY Left     pinning     Social History:  reports that he has been smoking cigarettes. He has been smoking about 1.00 pack per day. He has quit using smokeless tobacco.  His smokeless tobacco use included chew. He reports previous drug use. He reports that he does not drink alcohol. Family History: family history is not on file. Allergies: Vicodin [hydrocodone-acetaminophen]    Physical Exam    VS:  /82   Pulse 66   Temp 98 °F (36.7 °C)   Wt 240 lb (108.9 kg)   SpO2 98%   BMI 39.94 kg/m²      General Appearance/Constitutional:  Alert, development consistent with age, NAD. HEENT:  NCAT. Lungs: CTAB without wheezing, rales, or rhonchi. Heart:  RRR, no murmurs, rubs, or gallops. Abdomen:  Soft, mild tenderness over epigastric area, not distended. No organomegaly, or masses. No rebound, guarding or rigidity. Normal BS. Joe Mariza sign is negative. Negative McBurney's. Psoas sign is negative. Obturator sign is negative. No peritoneal signs. Back: No CVA bilateral tenderness or bruising. Skin:  Normal turgor. Warm, dry, without visible rash, unless noted elsewhere. Neurological:  Orientation age-appropriate. Motor functions intact. Lab / Imaging Results   (All laboratory and radiology results have been personally reviewed by myself)  Labs:  Results for orders placed or performed in visit on 06/07/22   POCT Influenza A/B   Result Value Ref Range    Influenza A Ab negative     Influenza B Ab negative    POCT Urinalysis no Micro   Result Value Ref Range    Color, UA Yellow     Clarity, UA clear     Glucose, UA POC Negative     Bilirubin, UA Negative     Ketones, UA Negative     Spec Grav, UA 1.020     Blood, UA POC Negative     pH, UA 7.0     Protein, UA POC Negative     Urobilinogen, UA 0.2     Leukocytes, UA Negative     Nitrite, UA Negative        Imaging: All Radiology results interpreted by Radiologist unless otherwise noted. Medical Decision Making   Pt non-toxic, in no apparent distress and stable at time of discharge. Assessment / Plan     Impression(s):  Stephany Acuña was seen today for nausea & vomiting and diarrhea. Diagnoses and all orders for this visit:    Gastroenteritis    Abdominal pain, vomiting, and diarrhea  -     POCT Influenza A/B  -     POCT Urinalysis no Micro  -     Gastrointestinal Panel, Molecular; Future        -At time of this exam, Stephany Acuña appears well hydrated and nontoxic with no peritoneal findings. He has drank and retained fluids today. Vital signs are stable. -POC rapid influenza negative today and UA reported as negative for leukocytes, nitrites, blood. - Discussed viral gastroenteritis vs food poisoning vs more concerning etiology of symptoms including appendicitis. Strict ED follow up for any worsening of today's symptoms as a walk in clinic is not able to work up. - Will send home with stool collection kit with instructions due to concern for food poisoning today. - Supportive care at home including BRATY diet (bananas, rice, applesauce, toast and yogurt) with advancing as tolerated.   Increase fluids and rest.  Avoid spicy foods, caffeine, and alcohol to prevent exacerbation.  - EDsooner if symptoms worsen or change. Shoshana Ardon, APRN - CNP    *This report was transcribed using voice recognition software. Every effort was made to ensure accuracy; however, inadvertent computerized transcription errors may be present.

## 2022-06-07 NOTE — LETTER
[address]  [place] [address]  Phone: [phone]  Fax: [address] [facility], [address], APRN - CNP        June 7, 2022     Patient: Nely Walter. YOB: 1985   Date of Visit: 6/7/2022       To Whom It May Concern: It is my medical opinion that Rafat Luna may return to work on 06/08/2022. If you have any questions or concerns, please don't hesitate to call.     Sincerely,        Brittany Velasco, PEDRO - CNP

## 2022-06-07 NOTE — PATIENT INSTRUCTIONS
Patient Education        Gastroenteritis: Care Instructions  Overview     Gastroenteritis is an illness that may cause nausea, vomiting, and diarrhea. Itcan be caused by bacteria or a virus. You will probably begin to feel better in 1 to 2 days. In the meantime, get plenty of rest and make sure you do not become dehydrated. Dehydration occurswhen your body loses too much fluid. Follow-up care is a key part of your treatment and safety. Be sure to make and go to all appointments, and call your doctor if you are having problems. It's also a good idea to know your test results and keep alist of the medicines you take. How can you care for yourself at home?  If your doctor prescribed antibiotics, take them as directed. Do not stop taking them just because you feel better. You need to take the full course of antibiotics.  Drink plenty of fluids to prevent dehydration. Choose water and other clear liquids until you feel better. If you have kidney, heart, or liver disease and have to limit fluids, talk with your doctor before you increase your fluid intake.  Drink fluids slowly, in frequent, small amounts, because drinking too much too fast can cause vomiting.  Begin eating mild foods, such as dry toast, yogurt, applesauce, bananas, and rice. Avoid spicy, hot, or high-fat foods, and do not drink alcohol or caffeine for a day or two. Do not drink milk or eat ice cream until you are feeling better. How to prevent gastroenteritis   Keep hot foods hot and cold foods cold.  Do not eat meats, dressings, salads, or other foods that have been kept at room temperature for more than 2 hours.  Use a thermometer to check your refrigerator. It should be between 34°F and 40°F.   Defrost meats in the refrigerator or microwave, not on the kitchen counter.  Keep your hands and your kitchen clean. Wash your hands, cutting boards, and countertops with hot soapy water frequently.  Cook meat until it is well done.    Do not eat raw eggs or uncooked sauces made with raw eggs.  Do not take chances. If food looks or tastes spoiled, throw it out. When should you call for help? Call 911 anytime you think you may need emergency care. For example, call if:     You vomit blood or what looks like coffee grounds.      You passed out (lost consciousness).      You pass maroon or very bloody stools. Call your doctor now or seek immediate medical care if:     You have severe belly pain.      You have signs of needing more fluids. You have sunken eyes, a dry mouth, and pass only a little urine.      You feel like you are going to faint.      You have increased belly pain that does not go away in 1 to 2 days.      You have new or increased nausea, or you are vomiting.      You have a new or higher fever.      Your stools are black and tarlike or have streaks of blood. Watch closely for changes in your health, and be sure to contact your doctor if:     You are dizzy or lightheaded.      You urinate less than usual, or your urine is dark yellow or brown.      You do not feel better with each day that goes by. Where can you learn more? Go to https://[employer]. org and sign in to your [employer] account. Enter N142 in the [facility] box to learn more about \"Gastroenteritis: Care Instructions. \"     If you do not have an account, please click on the \"Sign Up Now\" link. Current as of: July 1, 2021               Content Version: 13.2  © 2006-2022 Healthwise, Incorporated. Care instructions adapted under license by [facility] ([facility]). If you have questions about a medical condition or this instruction, always ask your healthcare professional. [place] [address] any warranty or liability for your use of this information.

## 2022-08-02 ENCOUNTER — APPOINTMENT (OUTPATIENT)
Dept: GENERAL RADIOLOGY | Age: 37
End: 2022-08-02
Payer: COMMERCIAL

## 2022-08-02 ENCOUNTER — HOSPITAL ENCOUNTER (EMERGENCY)
Age: 37
Discharge: HOME | End: 2022-08-02
Attending: FAMILY MEDICINE
Payer: COMMERCIAL

## 2022-08-02 VITALS
DIASTOLIC BLOOD PRESSURE: 91 MMHG | SYSTOLIC BLOOD PRESSURE: 165 MMHG | HEART RATE: 66 BPM | WEIGHT: 237.5 LBS | HEIGHT: 65 IN | RESPIRATION RATE: 20 BRPM | BODY MASS INDEX: 39.57 KG/M2 | OXYGEN SATURATION: 97 % | TEMPERATURE: 98.2 F

## 2022-08-02 DIAGNOSIS — S93.602A FOOT SPRAIN, LEFT, INITIAL ENCOUNTER: Primary | ICD-10-CM

## 2022-08-02 PROCEDURE — 99283 EMERGENCY DEPT VISIT LOW MDM: CPT

## 2022-08-02 PROCEDURE — 73630 X-RAY EXAM OF FOOT: CPT

## 2022-08-02 ASSESSMENT — PAIN - FUNCTIONAL ASSESSMENT: PAIN_FUNCTIONAL_ASSESSMENT: NONE - DENIES PAIN

## 2022-08-02 NOTE — LETTER
[facility] ED  [address], [address]  Phone: [phone]               August 3, 2022    Patient: Janis Cisneros. YOB: 1985   Date of Visit: 8/2/2022       To Whom It May Concern:    Marla Holcomb was seen and treated in our emergency department on 8/2/2022.  He may return to work 8/5/22      Sincerely,       Maura Barnhart RN         Signature:__________________________________

## 2022-08-02 NOTE — LETTER
[facility] [address] [address]  Phone: [phone]               August 2, 2022    Patient: Mackenzie Chacon. YOB: 1985   Date of Visit: 8/2/2022       To Whom It May Concern:    Shadi Quinones was seen and treated in our emergency department on 8/2/2022. He may return to work on 8/3/22.       Sincerely,       Meli Gerber RN         Signature:__________________________________

## 2022-08-03 NOTE — ED PROVIDER NOTES
[address] [facility]  eMERGENCY dEPARTMENT eNCOUnter          [address] [facility]       Chief Complaint   Patient presents with    Foot Pain     Patient reports he was working out 4 days ago and was doing a lunge. Tried to continue working out but had a pain in the left foot after. Patient has went to work the past 3 days but pain has not resolved to left foot. Has been wrapping foot with ACE bandage and swelling has went down. Nurses Notes reviewed and I agree except as noted in the HPI. HISTORY OF PRESENT ILLNESS    Monica Bonilla is a 29 y.o. male who presents emergency room via private vehicle, states 4 days ago was doing lunges at the gym without weights in his hand, afterwards was doing jumping jacks who admits he was not getting very high up in the air, since at times had pain indicating his left foot, is been able to ambulate for the past 3 days though pain is not resolved, has been wrapping his foot with an Ace wrap that initially felt help with swelling though now feels it may be actually worsening things. Patient denies anything falling on his foot denies any similar prior. Patient points to pain being the worst over the dorsum of his foot roughly overlying the mid aspect of the second metatarsal.    REVIEW OF SYSTEMS     Review of Systems   All other systems reviewed and are negative. PAST MEDICAL HISTORY    has no past medical history on file. SURGICAL HISTORY      has a past surgical history that includes hernia repair; hip surgery (Left); and cauterize inner nose (Left).     CURRENT MEDICATIONS       Discharge Medication List as of 8/2/2022  6:43 PM        CONTINUE these medications which have NOT CHANGED    Details   methocarbamol (ROBAXIN) 750 MG tablet TAKE 1 TABLET BY MOUTH THREE TIMES DAILY FOR 7 DAYSHistorical Med      methylPREDNISolone (MEDROL DOSEPACK) 4 MG tablet USE AS DIRECTED ON PACKAGE LABELINGHistorical Med      meloxicam (MOBIC) 15 MG tablet Take 1 tablet by mouth daily, Disp-90 tablet,R-1Normal      albuterol sulfate HFA (PROVENTIL HFA) 108 (90 Base) MCG/ACT inhaler Inhale 2 puffs into the lungs every 6 hours as needed for Wheezing, Disp-1 Inhaler, R-3Print             ALLERGIES     is allergic to vicodin [hydrocodone-acetaminophen]. FAMILY HISTORY     has no family status information on file. family history is not on file. SOCIAL HISTORY      reports that he has been smoking cigarettes. He has been smoking an average of .5 packs per day. He has quit using smokeless tobacco.  His smokeless tobacco use included chew. He reports that he does not currently use drugs. He reports that he does not drink alcohol. PHYSICAL EXAM     INITIAL VITALS:  height is 5' 5\" (1.651 m) and weight is 237 lb 8 oz (107.7 kg). His oral temperature is 98.2 °F (36.8 °C). His blood pressure is 165/91 (abnormal) and his pulse is 66. His respiration is 20 and oxygen saturation is 97%. Physical Exam   Constitutional: Patient is oriented to person, place, and time. Patient appears well-developed and well-nourished. Patient is active and cooperative. Neck: Full passive range of motion without pain and phonation normal.   Cardiovascular:  Normal rate, regular rhythm and intact distal pulses. Pulses: Left DP pulse  2+   Pulmonary/Chest: Effort normal. No tachypnea and no bradypnea.    Abdominal: BMI 39.5, patient without distension   Musculoskeletal:   Focused examination of patient's left lower extremity, left foot, shows no gross trauma or deformity, there is noted pain to palpation at times be seems to be between the fourth and fifth metatarsal lower times seems to be possible between the more lateral aspect of the third and fourth metatarsal area, no overlying integument aberration, distal CSM intact    Except as otherwise noted, negative acute trauma or deformity,  apparent full range of motion and normal strength all extremities appropriate to age.  Neurological: Patient is alert and oriented to person, place, and time. patient displays no tremor. Patient displays no seizure activity. .    Skin: Skin is warm and dry. Patient is not diaphoretic. Psychiatric: Patient has a normal mood and affect. Patient speech is normal and behavior is normal. Cognition and memory are normal.     DIFFERENTIAL DIAGNOSIS:   Fracture, connective tissue injury, Varghese's neuroma    DIAGNOSTIC RESULTS           RADIOLOGY: non-plain film images(s) such as CT, Ultrasound and MRI are read by the radiologist.  XR FOOT LEFT (MIN 3 VIEWS)   Final Result   FINDINGS/IMPRESSION:       No evidence of acute osseous abnormality of the left foot. LABS:   Labs Reviewed - No data to display    EMERGENCY DEPARTMENT COURSE:   Vitals:    Vitals:    08/02/22 1704   BP: (!) 165/91   Pulse: 66   Resp: 20   Temp: 98.2 °F (36.8 °C)   TempSrc: Oral   SpO2: 97%   Weight: 237 lb 8 oz (107.7 kg)   Height: 5' 5\" (1.651 m)     Patient history and physical exam taken patient sitting upright in chair, discussed patient symptoms and exam findings, will get x-rays of the foot to make sure there is no fracture, will reevaluate. Imaging reviewed    Discussed with patient imaging findings, discussed possibility of soft tissue injury such as tearing, connective tissue of due to trauma, at this time I feel we can safely discharge patient home, we discussed Motrin Tylenol for pain, ice the foot down as needed throughout the day for control, will refer to podiatry for follow-up, acknowledged    FINAL IMPRESSION      1.  Foot sprain, left, initial encounter          DISPOSITION/PLAN   D/c    PATIENT REFERRED TO:  Stiven Infante DPM  [address] [facility] [address] [facility] [address]  [phone]    Call   Podiatry    Dr. Payton Ribera  [phone] (office)  Call   Podiatry    Bladimir Marcelo MD  [address] [place] [address] [phone]    Call   As needed      DISCHARGE MEDICATIONS:  Discharge Medication List as of 8/2/2022  6:43 PM              Summation      Patient Course:  d/c    ED Medications administered this visit:  Medications - No data to display    New Prescriptions from this visit:    Discharge Medication List as of 8/2/2022  6:43 PM          Follow-up:  Zelda Caban DPM  [address] [facility] [address], [facility] [address] [address]  [phone]    Call   Podiatry    Dr. Ingris Zavala  [phone] (office)  Call   Podiatry    Betty Mcbride MD  [address] [place] [address]  [phone]    Call   As needed        Final Impression:   1.  Foot sprain, left, initial encounter               (Please note that portions of this note were completed with a voice recognition program.  Efforts were made to edit the dictations but occasionally words are mis-transcribed.)    MD Valarie Forrest MD  08/03/22 5625

## 2023-03-08 ENCOUNTER — HOSPITAL ENCOUNTER (OUTPATIENT)
Age: 38
Setting detail: SPECIMEN
Discharge: HOME | End: 2023-03-08

## 2023-03-08 ENCOUNTER — OFFICE VISIT (OUTPATIENT)
Dept: PRIMARY CARE CLINIC | Age: 38
End: 2023-03-08

## 2023-03-08 VITALS
RESPIRATION RATE: 19 BRPM | DIASTOLIC BLOOD PRESSURE: 85 MMHG | SYSTOLIC BLOOD PRESSURE: 142 MMHG | HEART RATE: 70 BPM | BODY MASS INDEX: 40.77 KG/M2 | WEIGHT: 245 LBS | OXYGEN SATURATION: 98 % | TEMPERATURE: 97.6 F

## 2023-03-08 DIAGNOSIS — R68.89 FLU-LIKE SYMPTOMS: Primary | ICD-10-CM

## 2023-03-08 DIAGNOSIS — R68.89 FLU-LIKE SYMPTOMS: ICD-10-CM

## 2023-03-08 PROBLEM — B35.3 ATHLETE'S FOOT: Status: ACTIVE | Noted: 2023-03-08

## 2023-03-08 PROBLEM — M79.672 LEFT FOOT PAIN: Status: ACTIVE | Noted: 2023-03-08

## 2023-03-08 PROBLEM — M72.2 PLANTAR FASCIAL FIBROMATOSIS: Status: ACTIVE | Noted: 2023-03-08

## 2023-03-08 PROBLEM — M84.375A STRESS FRACTURE OF METATARSAL BONE OF LEFT FOOT: Status: ACTIVE | Noted: 2023-03-08

## 2023-03-08 PROBLEM — M65.872 OTHER SYNOVITIS AND TENOSYNOVITIS, LEFT ANKLE AND FOOT: Status: ACTIVE | Noted: 2023-03-08

## 2023-03-08 PROBLEM — M79.609 PAIN IN LIMB: Status: ACTIVE | Noted: 2023-03-08

## 2023-03-08 LAB
INFLUENZA A ANTIBODY: NEGATIVE
INFLUENZA B ANTIBODY: NEGATIVE
SARS-COV-2 RDRP RESP QL NAA+PROBE: NOT DETECTED
SPECIMEN DESCRIPTION: NORMAL

## 2023-03-08 PROCEDURE — 99213 OFFICE O/P EST LOW 20 MIN: CPT | Performed by: NURSE PRACTITIONER

## 2023-03-08 PROCEDURE — 87804 INFLUENZA ASSAY W/OPTIC: CPT | Performed by: NURSE PRACTITIONER

## 2023-03-08 PROCEDURE — 36415 COLL VENOUS BLD VENIPUNCTURE: CPT

## 2023-03-08 PROCEDURE — 87635 SARS-COV-2 COVID-19 AMP PRB: CPT

## 2023-03-08 ASSESSMENT — ENCOUNTER SYMPTOMS
RHINORRHEA: 0
NAUSEA: 1
WHEEZING: 0
SHORTNESS OF BREATH: 1
COUGH: 0
VOMITING: 1
DIARRHEA: 0
SORE THROAT: 0

## 2023-03-08 NOTE — PROGRESS NOTES
[address] [facility] WALK-IN CARE  [address] [facility] [address]  Dept: [phone]  Dept Fax: [phone]    Meron Perales is a 40 y.o. male who presents to the [facility] in Care today for hismedical conditions/complaints as noted below. Meron Perales. is c/o of Headache (Patient c/o headache, worse yesterday and last night but still there- all frontal. No known exposure to Influenza or Covid. ) and Generalized Body Aches (C/o general all over aches. He states that he has no energy. )      HPI:     Generalized Body Aches  This is a new problem. The current episode started yesterday (Reports woke up last night from nap, works 3rd shift,  having frontal headache and general all over aches. He states that he has no energy. Vomited once and still having nausea. ). Associated symptoms include fatigue, headaches, myalgias, nausea and vomiting. Pertinent negatives include no anorexia, chills, congestion, coughing, diaphoresis, fever, rash or sore throat. Nothing aggravates the symptoms. Treatments tried: Flannex. The treatment provided mild relief. No past medical history on file. No current outpatient medications on file. No current facility-administered medications for this visit. Allergies   Allergen Reactions    Vicodin [Hydrocodone-Acetaminophen] Other (See Comments)     Upset stomach       :     Review of Systems   Constitutional:  Positive for appetite change (No appetite.) and fatigue. Negative for chills, diaphoresis and fever. HENT:  Negative for congestion, ear pain, rhinorrhea and sore throat. Respiratory:  Positive for shortness of breath (Little bit.). Negative for cough and wheezing. Gastrointestinal:  Positive for nausea and vomiting. Negative for anorexia and diarrhea. Musculoskeletal:  Positive for myalgias. Skin:  Negative for rash and wound.    Neurological:  Positive for dizziness, light-headedness and headaches.     :     Physical Exam  Vitals and nursing note reviewed. Constitutional:       General: He is not in acute distress. Appearance: Normal appearance. He is well-developed. He is not ill-appearing or diaphoretic. Comments: Well hydrated, nontoxic appearance. Appears to not feel well. HENT:      Head: Normocephalic and atraumatic. Right Ear: Hearing, ear canal and external ear normal. A middle ear effusion (Pale white fluid.) is present. No mastoid tenderness. Tympanic membrane is not injected, erythematous or bulging. Left Ear: Hearing, ear canal and external ear normal. A middle ear effusion (Pale white fluid.) is present. No mastoid tenderness. Tympanic membrane is not injected, erythematous or bulging. Nose: Nose normal. No mucosal edema, congestion or rhinorrhea. Right Sinus: No maxillary sinus tenderness or frontal sinus tenderness. Left Sinus: No maxillary sinus tenderness or frontal sinus tenderness. Mouth/Throat:      Lips: Pink. Mouth: Mucous membranes are moist.      Pharynx: Oropharynx is clear. Uvula midline. No pharyngeal swelling, oropharyngeal exudate, posterior oropharyngeal erythema or uvula swelling. Eyes:      General:         Right eye: No discharge. Left eye: No discharge. Conjunctiva/sclera: Conjunctivae normal.      Pupils: Pupils are equal, round, and reactive to light. Cardiovascular:      Rate and Rhythm: Normal rate and regular rhythm. Heart sounds: Normal heart sounds, S1 normal and S2 normal. No murmur heard. No friction rub. No gallop. Pulmonary:      Effort: Pulmonary effort is normal. No accessory muscle usage or respiratory distress. Breath sounds: Normal breath sounds and air entry. No decreased breath sounds, wheezing, rhonchi or rales. Comments: No cough during exam.  Breath sounds clear B/L anterior and posterior lobes.   Chest expansion symmetrical.  No audible wheezing or respiratory distress. No rales or rhonchi. Abdominal:      General: Bowel sounds are normal.      Palpations: Abdomen is soft. Tenderness: There is no abdominal tenderness. Musculoskeletal:         General: Normal range of motion. Lymphadenopathy:      Cervical: No cervical adenopathy. Right cervical: No superficial or posterior cervical adenopathy. Left cervical: No superficial or posterior cervical adenopathy. Skin:     General: Skin is warm and dry. Coloration: Skin is not pale. Findings: No erythema or rash. Neurological:      Mental Status: He is alert and oriented to person, place, and time. Psychiatric:         Behavior: Behavior normal. Behavior is cooperative. BP (!) 142/85   Pulse 70   Temp 97.6 °F (36.4 °C)   Resp 19   Wt 245 lb (111.1 kg)   SpO2 98%   BMI 40.77 kg/m²     Results for orders placed or performed in visit on 03/08/23   POCT Influenza A/B   Result Value Ref Range    Influenza A Ab Negative     Influenza B Ab Negative       :      Diagnosis Orders   1. Flu-like symptoms  POCT Influenza A/B          :      Return if symptoms worsen or fail to improve, for Resume all previous medications as directed. No orders of the defined types were placed in this encounter. Practice meticulous handwashing and cover cough to prevent spread of infection. COVID-19 - will call with results. Do not return to work until symptoms have improved and no fever for 24 hrs without fever reducing medication. Quarantine x 5 days for positive covid-19. Encouraged to increase fluids and rest.  Encouraged to take Vitamin C 500 to 1000 mg daily, Zinc 50 mg daily, Vitamin D3 2,000 IU daily and Vitamin B complex daily or a multivitamin daily. Tylenol OTC PRN for pain, discomfort or fever as directed on package  Cool mist humidifier  Hot tea with honey and lemon for cough PRN  Patient instructions given for Flu symptoms and Covid 19 infection. .  To ER or call 911 if any increasing difficulty breathing, shortness of breath, inability to swallow, hives, rash, facial/tongue swelling or temp greater than 103 degrees. Follow up with PCP or Walk in Care as needed if symptoms worsen or do not improve. Kesha Berg received counseling on the following healthy behaviors: increased fluids and rest.  Patient given educational materials - see patient instructions. Discussed use, benefit, and side effects of prescribed medications. Treatment plan discussed at visit. Continue routine health care follow up. All patient questions answered. Pt voiced understanding.       Electronically signed by PEDRO Patel CNP on 3/9/2023 at 10:51 AM

## 2023-03-08 NOTE — PATIENT INSTRUCTIONS
Practice meticulous handwashing and cover cough to prevent spread of infection. COVID-19 - will call with results. Do not return to work until symptoms have improved and no fever for 24 hrs without fever reducing medication. Quarantine x 5 days for positive covid-19. Encouraged to increase fluids and rest.  Encouraged to take Vitamin C 500 to 1000 mg daily, Zinc 50 mg daily, Vitamin D3 2,000 IU daily and Vitamin B complex daily or a multivitamin daily. Tylenol OTC PRN for pain, discomfort or fever as directed on package  Cool mist humidifier  Hot tea with honey and lemon for cough PRN  Patient instructions given for Flu symptoms and Covid 19 infection. .  To ER or call 911 if any increasing difficulty breathing, shortness of breath, inability to swallow, hives, rash, facial/tongue swelling or temp greater than 103 degrees. Follow up with PCP or Walk in Care as needed if symptoms worsen or do not improve.

## 2024-03-26 ENCOUNTER — HOSPITAL ENCOUNTER (EMERGENCY)
Age: 39
Discharge: HOME | End: 2024-03-26
Attending: EMERGENCY MEDICINE
Payer: COMMERCIAL

## 2024-03-26 VITALS
DIASTOLIC BLOOD PRESSURE: 89 MMHG | TEMPERATURE: 97.8 F | BODY MASS INDEX: 45.05 KG/M2 | HEART RATE: 68 BPM | HEIGHT: 65 IN | WEIGHT: 270.4 LBS | RESPIRATION RATE: 19 BRPM | SYSTOLIC BLOOD PRESSURE: 129 MMHG | OXYGEN SATURATION: 96 %

## 2024-03-26 DIAGNOSIS — R04.0 EPISTAXIS: Primary | ICD-10-CM

## 2024-03-26 PROCEDURE — 99282 EMERGENCY DEPT VISIT SF MDM: CPT

## 2024-03-26 PROCEDURE — 6370000000 HC RX 637 (ALT 250 FOR IP): Performed by: EMERGENCY MEDICINE

## 2024-03-26 RX ADMIN — PHENYLEPHRINE HYDROCHLORIDE 1 SPRAY: 0.25 SPRAY NASAL at 23:02

## 2024-03-26 ASSESSMENT — ENCOUNTER SYMPTOMS
SINUS PRESSURE: 0
CHEST TIGHTNESS: 0
SINUS PAIN: 0
FACIAL SWELLING: 0
ABDOMINAL PAIN: 0

## 2024-03-26 ASSESSMENT — LIFESTYLE VARIABLES
HOW MANY STANDARD DRINKS CONTAINING ALCOHOL DO YOU HAVE ON A TYPICAL DAY: PATIENT DOES NOT DRINK
HOW OFTEN DO YOU HAVE A DRINK CONTAINING ALCOHOL: NEVER

## 2024-03-26 ASSESSMENT — PAIN - FUNCTIONAL ASSESSMENT: PAIN_FUNCTIONAL_ASSESSMENT: NONE - DENIES PAIN

## 2024-03-27 NOTE — ED PROVIDER NOTES
[facility]  EMERGENCY DEPARTMENT ENCOUNTER      Pt Name: Solis Comer Jr.  MRN: [id number]  Birthdate 1985  Date of evaluation: 3/26/2024  Provider: Robbin Colorado MD    CHIEF COMPLAINT       Chief Complaint   Patient presents with    Epistaxis     Patient states he had a nose bleed on the left side earlier today that lasted longer than 5 minutes then 10 minutes ago had another nose bleed lasting 3-4 minutes.         HISTORY OF PRESENT ILLNESS      Solis Comer Jr. is a 38 y.o. male who presents to the emergency department epistaxis out of left nare about 30 min ago. He states it stopped on his way here. He denies nasal trauma. Not on blood thinners no fevers.           REVIEW OF SYSTEMS       Review of Systems   HENT:  Positive for nosebleeds. Negative for ear pain, facial swelling, mouth sores, sinus pressure, sinus pain and sneezing.    Respiratory:  Negative for chest tightness.    Gastrointestinal:  Negative for abdominal pain.         PAST MEDICAL HISTORY     History reviewed. No pertinent past medical history.      SURGICAL HISTORY       Past Surgical History:   Procedure Laterality Date    CAUTERIZE INNER NOSE Left     HERNIA REPAIR      HIP SURGERY Left     pinning         CURRENT MEDICATIONS       Previous Medications    No medications on file       ALLERGIES       Vicodin [hydrocodone-acetaminophen]    FAMILY HISTORY       History reviewed. No pertinent family history.       SOCIAL HISTORY       Social History     Tobacco Use    Smoking status: Former     Current packs/day: 0.50     Types: Cigarettes    Smokeless tobacco: Former     Types: Chew   Vaping Use    Vaping Use: Never used   Substance Use Topics    Alcohol use: No    Drug use: Yes     Frequency: 2.0 times per week     Types: Marijuana (Weed)         PHYSICAL EXAM       ED Triage Vitals [03/26/24 2256]   BP Temp Temp Source Pulse Respirations SpO2 Height Weight - Scale   129/89 97.8 °F (36.6 °C) Oral 68 19 96 % 1.651 m (5'

## 2024-04-04 ENCOUNTER — OFFICE VISIT (OUTPATIENT)
Dept: FAMILY MEDICINE CLINIC | Age: 39
End: 2024-04-04
Payer: COMMERCIAL

## 2024-04-04 VITALS
BODY MASS INDEX: 44.32 KG/M2 | RESPIRATION RATE: 18 BRPM | HEIGHT: 65 IN | WEIGHT: 266 LBS | SYSTOLIC BLOOD PRESSURE: 160 MMHG | DIASTOLIC BLOOD PRESSURE: 90 MMHG | HEART RATE: 52 BPM | OXYGEN SATURATION: 97 %

## 2024-04-04 DIAGNOSIS — R45.4 DIFFICULTY CONTROLLING ANGER: ICD-10-CM

## 2024-04-04 DIAGNOSIS — F41.9 ANXIETY: ICD-10-CM

## 2024-04-04 DIAGNOSIS — I10 PRIMARY HYPERTENSION: ICD-10-CM

## 2024-04-04 DIAGNOSIS — G44.83 COUGH HEADACHE: ICD-10-CM

## 2024-04-04 DIAGNOSIS — J20.9 ACUTE BRONCHITIS, UNSPECIFIED ORGANISM: Primary | ICD-10-CM

## 2024-04-04 PROBLEM — M54.30 SCIATICA: Status: ACTIVE | Noted: 2024-04-04

## 2024-04-04 PROBLEM — M54.16 LUMBAR RADICULOPATHY: Status: ACTIVE | Noted: 2024-04-04

## 2024-04-04 LAB
INFLUENZA A ANTIGEN, POC: NEGATIVE
INFLUENZA B ANTIGEN, POC: NEGATIVE
LOT NUMBER POC: NORMAL
SARS-COV-2 RNA POC - COV: NORMAL
VALID INTERNAL CONTROL, POC: YES
VENDOR AND KIT NAME POC: NORMAL

## 2024-04-04 PROCEDURE — 3077F SYST BP >= 140 MM HG: CPT | Performed by: INTERNAL MEDICINE

## 2024-04-04 PROCEDURE — 3080F DIAST BP >= 90 MM HG: CPT | Performed by: INTERNAL MEDICINE

## 2024-04-04 PROCEDURE — 99204 OFFICE O/P NEW MOD 45 MIN: CPT | Performed by: INTERNAL MEDICINE

## 2024-04-04 RX ORDER — PREDNISONE 20 MG/1
20 TABLET ORAL DAILY
Qty: 5 TABLET | Refills: 0 | Status: SHIPPED | OUTPATIENT
Start: 2024-04-04 | End: 2024-04-09

## 2024-04-04 RX ORDER — LOSARTAN POTASSIUM 25 MG/1
25 TABLET ORAL DAILY
Qty: 30 TABLET | Refills: 5 | Status: SHIPPED | OUTPATIENT
Start: 2024-04-04

## 2024-04-04 RX ORDER — AZITHROMYCIN 500 MG/1
500 TABLET, FILM COATED ORAL DAILY
Qty: 5 TABLET | Refills: 0 | Status: SHIPPED | OUTPATIENT
Start: 2024-04-04 | End: 2024-04-09

## 2024-04-04 SDOH — ECONOMIC STABILITY: FOOD INSECURITY: WITHIN THE PAST 12 MONTHS, THE FOOD YOU BOUGHT JUST DIDN'T LAST AND YOU DIDN'T HAVE MONEY TO GET MORE.: NEVER TRUE

## 2024-04-04 SDOH — ECONOMIC STABILITY: HOUSING INSECURITY
IN THE LAST 12 MONTHS, WAS THERE A TIME WHEN YOU DID NOT HAVE A STEADY PLACE TO SLEEP OR SLEPT IN A SHELTER (INCLUDING NOW)?: NO

## 2024-04-04 SDOH — ECONOMIC STABILITY: FOOD INSECURITY: WITHIN THE PAST 12 MONTHS, YOU WORRIED THAT YOUR FOOD WOULD RUN OUT BEFORE YOU GOT MONEY TO BUY MORE.: NEVER TRUE

## 2024-04-04 SDOH — ECONOMIC STABILITY: INCOME INSECURITY: HOW HARD IS IT FOR YOU TO PAY FOR THE VERY BASICS LIKE FOOD, HOUSING, MEDICAL CARE, AND HEATING?: NOT HARD AT ALL

## 2024-04-04 ASSESSMENT — ENCOUNTER SYMPTOMS
DIARRHEA: 1
BLOOD IN STOOL: 0
SORE THROAT: 1
RHINORRHEA: 1
SHORTNESS OF BREATH: 0
VOICE CHANGE: 0
WHEEZING: 0
NAUSEA: 1
SINUS PAIN: 0
CONSTIPATION: 0
SINUS PRESSURE: 1
COUGH: 1
ALLERGIC/IMMUNOLOGIC NEGATIVE: 1

## 2024-04-04 ASSESSMENT — PATIENT HEALTH QUESTIONNAIRE - PHQ9
SUM OF ALL RESPONSES TO PHQ QUESTIONS 1-9: 0
SUM OF ALL RESPONSES TO PHQ9 QUESTIONS 1 & 2: 0
SUM OF ALL RESPONSES TO PHQ QUESTIONS 1-9: 0
SUM OF ALL RESPONSES TO PHQ QUESTIONS 1-9: 0
1. LITTLE INTEREST OR PLEASURE IN DOING THINGS: NOT AT ALL
SUM OF ALL RESPONSES TO PHQ QUESTIONS 1-9: 0
2. FEELING DOWN, DEPRESSED OR HOPELESS: NOT AT ALL

## 2024-04-04 NOTE — PROGRESS NOTES
HPI Notes    Name: Solis Comer Jr.  : 1985         Chief Complaint:     Chief Complaint   Patient presents with    Congestion     Patient is 3 plus days, aches, SOB, sore throat, cough and not feeling good.    Hypertension     Patient a few weeks ago in ED for nose bleed with high BP then as well.       History of Present Illness:        HPI    Solis Comer presents to office to reestablish care.   We have not seen him for more than 3 years  Today he c/o congestion, cough, sore throat, nausea, diarrhea. Symptoms started about 4 days ago. Had chills, not sure if had fever, but felt warm. Has associated HA, pressure in his head from the cough. He tried OTC Nyquil , cough suppressant and it did not help much   Cough is productive with small amount of yellowish sputum. He smokes about 1 ppd.   Reports no SOB but does have wheezes at nights       Today he also c/o issues with Anxiety and Anger. Says it started several years ago. Says when he quit alcohol 5 yrs ago the symptoms became worse. Says he gets easily angry over small things. Has difficult time keeping relationships. Reports no depression.    He is interested in a referral to a counseling       He is noted to have elevated BP. Says was told in the past his BP is high. Not on meds for BP.       Past Medical History:     No past medical history on file.   Reviewed all health maintenance requirements and orderedappropriate tests  Health Maintenance Due   Topic Date Due    Hepatitis B vaccine (1 of 3 - 3-dose series) Never done    COVID-19 Vaccine (1) Never done    Varicella vaccine (1 of 2 - 2-dose childhood series) Never done    Depression Screen  Never done    HIV screen  Never done    Hepatitis C screen  Never done    DTaP/Tdap/Td vaccine (1 - Tdap) Never done    Diabetes screen  Never done       Past Surgical History:     Past Surgical History:   Procedure Laterality Date    CAUTERIZE INNER NOSE Left     HERNIA REPAIR      HIP SURGERY Left

## 2024-04-04 NOTE — PATIENT INSTRUCTIONS
records, review test results, and communicate with your healthcare provider all in one secure and user-friendly platform.    3. Timesaving: By utilizing direct scheduling, you can avoid unnecessary delays and save ailyn time. You can easily browse the available appointment slots and select the one that works best for you, eliminating the back-and-forth communication typically required when scheduling via phone.    4. Reminders and notifications: [employer] Patient Portal sends automatic reminders for upcoming appointments, ensuring that you never miss an important visit. You can also receive notifications about test results and important health updates, keeping you well-informed and engaged in your healthcare journey.    5. Increased engagement and collaboration: Direct scheduling encourages greater patient engagement and empowers you to take an active role in managing your healthcare. By accessing the [employer] Patient Portal, you can securely message your healthcare provider, ask questions, request prescription refills, and seek medical advice whenever you need it.    To get started with direct scheduling through the [employer] Patient Portal or to register with [facility], simply visit [email or web address].     We understand that change can sometimes be overwhelming, but we assure you that adopting direct scheduling through the [employer] Patient Portal will enhance your healthcare experience and put you in control of your appointments. Our dedicated staff is available to answer any questions or provide assistance throughout the process.    Thank you for entrusting us with your healthcare needs. We are committed to continuously improving our services and ensuring your satisfaction. Together, let's embrace the future of healthcare convenience and accessibility through direct scheduling on the [employer] Patient Portal.    Wishing you good health and happiness,    Brinda Ceja

## 2024-08-02 ENCOUNTER — OFFICE VISIT (OUTPATIENT)
Dept: FAMILY MEDICINE CLINIC | Age: 39
End: 2024-08-02
Payer: COMMERCIAL

## 2024-08-02 VITALS
BODY MASS INDEX: 44.32 KG/M2 | HEART RATE: 77 BPM | RESPIRATION RATE: 18 BRPM | OXYGEN SATURATION: 96 % | SYSTOLIC BLOOD PRESSURE: 152 MMHG | WEIGHT: 266 LBS | DIASTOLIC BLOOD PRESSURE: 84 MMHG | HEIGHT: 65 IN

## 2024-08-02 DIAGNOSIS — M25.511 ACUTE PAIN OF RIGHT SHOULDER: Primary | ICD-10-CM

## 2024-08-02 DIAGNOSIS — F41.9 ANXIETY: ICD-10-CM

## 2024-08-02 DIAGNOSIS — I10 PRIMARY HYPERTENSION: ICD-10-CM

## 2024-08-02 DIAGNOSIS — Z13.220 LIPID SCREENING: ICD-10-CM

## 2024-08-02 PROCEDURE — 99214 OFFICE O/P EST MOD 30 MIN: CPT | Performed by: INTERNAL MEDICINE

## 2024-08-02 PROCEDURE — 3079F DIAST BP 80-89 MM HG: CPT | Performed by: INTERNAL MEDICINE

## 2024-08-02 PROCEDURE — 3077F SYST BP >= 140 MM HG: CPT | Performed by: INTERNAL MEDICINE

## 2024-08-02 RX ORDER — LOSARTAN POTASSIUM 50 MG/1
50 TABLET ORAL DAILY
Qty: 90 TABLET | Refills: 1 | Status: SHIPPED | OUTPATIENT
Start: 2024-08-02

## 2024-08-02 RX ORDER — PROPRANOLOL HYDROCHLORIDE 40 MG/1
40 TABLET ORAL DAILY
Qty: 30 TABLET | Refills: 1 | Status: SHIPPED | OUTPATIENT
Start: 2024-08-02

## 2024-08-02 RX ORDER — CYCLOBENZAPRINE HCL 5 MG
5 TABLET ORAL 2 TIMES DAILY PRN
Qty: 10 TABLET | Refills: 0 | Status: SHIPPED | OUTPATIENT
Start: 2024-08-02 | End: 2024-08-12

## 2024-08-02 RX ORDER — POTASSIUM CITRATE 10 MEQ/1
TABLET, EXTENDED RELEASE ORAL
COMMUNITY

## 2024-08-02 RX ORDER — IBUPROFEN 600 MG/1
600 TABLET ORAL 3 TIMES DAILY PRN
Qty: 270 TABLET | Refills: 1 | Status: SHIPPED | OUTPATIENT
Start: 2024-08-02

## 2024-08-02 RX ORDER — MAGNESIUM 30 MG
30 TABLET ORAL 2 TIMES DAILY
COMMUNITY

## 2024-08-02 RX ORDER — FLUOXETINE HYDROCHLORIDE 20 MG/1
20 CAPSULE ORAL DAILY
Qty: 30 CAPSULE | Refills: 3 | Status: SHIPPED | OUTPATIENT
Start: 2024-08-02

## 2024-08-02 RX ORDER — PREDNISONE 20 MG/1
40 TABLET ORAL DAILY
Qty: 10 TABLET | Refills: 0 | Status: SHIPPED | OUTPATIENT
Start: 2024-08-02 | End: 2024-08-07

## 2024-08-02 ASSESSMENT — ENCOUNTER SYMPTOMS
ABDOMINAL PAIN: 0
COUGH: 0
NAUSEA: 0
SORE THROAT: 0
SHORTNESS OF BREATH: 0
CONSTIPATION: 0
ALLERGIC/IMMUNOLOGIC NEGATIVE: 1
WHEEZING: 0
BLOOD IN STOOL: 0

## 2024-08-02 NOTE — PROGRESS NOTES
HPI Notes    Name: Solis Comer Jr.  : 1985         Chief Complaint:     Chief Complaint   Patient presents with    Shoulder Pain     Bilateral shoulder pain. Right side is due to a possible tare. Left side is non injury related.     Other     Pt states he is struggling mentally. States he almost quit his job due to mental state. States he panics, has memory loss and now is struggling to sleep.        History of Present Illness:        Solis presents to office for evaluation of pain in both shoulder, Anxiety, HTN.    States he was lifting something heavy last week and developed sharp pain in the right shoulder. He felt something snapped/popped in the right shoulder. He dropped the cookie case he was lifting. Says the pain is only present when he tries to raise his RUE. He iced it and tried to apply heat the next day. It helped a little bit. The pain is intermittent. Did not try OTC pain meds.    Never had problems in the shoulder.     Solis presents as a follow up on anxiety. Has a h/o Anxiety for many years off and on.   Not on any medication for anxiety . Was referred to a counselor last April but did not follow up.  Has difficulty sleeping. Stats gets mad very fast. When gets angry develops \" brain fog\".               Hypertension  This is a chronic problem. The current episode started more than 1 year ago. The problem is unchanged. The problem is uncontrolled. Associated symptoms include anxiety. Pertinent negatives include no chest pain, headaches, palpitations, peripheral edema or shortness of breath. There are no associated agents to hypertension. Risk factors for coronary artery disease include male gender, obesity and smoking/tobacco exposure. Past treatments include angiotensin blockers. The current treatment provides moderate improvement. There are no compliance problems.  There is no history of kidney disease, CAD/MI or CVA.           Past Medical History:     History reviewed. No pertinent

## 2024-08-02 NOTE — PATIENT INSTRUCTIONS
SURVEY:    You may be receiving a survey from [facility] regarding your visit today.    Please complete the survey to enable us to provide the highest quality of care to you and your family.    If you cannot score us a very good on any question, please call the office to discuss how we could have made your experience a very good one.    Thank you.  [facility]  MD Kimberlee Vera, MD Rojelio Lorenz, DO  Keith Riddle, MD Francine Foley, APRN-JOSE Duffy, Practice Manager  Faustina, CMA  Brinda, CCMA  César, CMA  Alaina, CMA  Diego, PSC   Cande, LPN  Corrina, A

## 2024-09-03 ENCOUNTER — OFFICE VISIT (OUTPATIENT)
Dept: FAMILY MEDICINE CLINIC | Age: 39
End: 2024-09-03
Payer: COMMERCIAL

## 2024-09-03 VITALS
BODY MASS INDEX: 42.99 KG/M2 | HEIGHT: 65 IN | SYSTOLIC BLOOD PRESSURE: 148 MMHG | WEIGHT: 258 LBS | RESPIRATION RATE: 18 BRPM | OXYGEN SATURATION: 97 % | HEART RATE: 68 BPM | DIASTOLIC BLOOD PRESSURE: 92 MMHG

## 2024-09-03 DIAGNOSIS — F41.9 ANXIETY: ICD-10-CM

## 2024-09-03 DIAGNOSIS — M25.511 ACUTE PAIN OF RIGHT SHOULDER: ICD-10-CM

## 2024-09-03 DIAGNOSIS — I10 PRIMARY HYPERTENSION: Primary | ICD-10-CM

## 2024-09-03 PROCEDURE — 3080F DIAST BP >= 90 MM HG: CPT | Performed by: INTERNAL MEDICINE

## 2024-09-03 PROCEDURE — 3077F SYST BP >= 140 MM HG: CPT | Performed by: INTERNAL MEDICINE

## 2024-09-03 PROCEDURE — 99214 OFFICE O/P EST MOD 30 MIN: CPT | Performed by: INTERNAL MEDICINE

## 2024-09-03 RX ORDER — PREDNISONE 20 MG/1
40 TABLET ORAL DAILY
Qty: 10 TABLET | Refills: 0 | Status: SHIPPED | OUTPATIENT
Start: 2024-09-03 | End: 2024-09-08

## 2024-09-03 RX ORDER — LOSARTAN POTASSIUM AND HYDROCHLOROTHIAZIDE 12.5; 1 MG/1; MG/1
1 TABLET ORAL DAILY
Qty: 90 TABLET | Refills: 1 | Status: SHIPPED | OUTPATIENT
Start: 2024-09-03

## 2024-09-03 ASSESSMENT — ENCOUNTER SYMPTOMS
COUGH: 0
BLOOD IN STOOL: 0
CONSTIPATION: 0
ALLERGIC/IMMUNOLOGIC NEGATIVE: 1
WHEEZING: 0
SORE THROAT: 0
ABDOMINAL PAIN: 0
SHORTNESS OF BREATH: 0
NAUSEA: 0

## 2024-09-03 NOTE — PATIENT INSTRUCTIONS
SURVEY:    You may be receiving a survey from [facility] regarding your visit today.    Please complete the survey to enable us to provide the highest quality of care to you and your family.    If you cannot score us a very good on any question, please call the office to discuss how we could have made your experience a very good one.    Thank you.  [facility]  MD Kimberlee Vera, MD Rojelio Lorenz, DO  Keith Riddle, MD Francine Foley, APRN-JOSE Duffy, Practice Manager  Faustina, CMA  Brinda, CCMA  César, CMA  Alaina, CMA  Diego, PSC   Cande, LPN  Corrina, A

## 2024-09-03 NOTE — PROGRESS NOTES
HPI Notes    Name: Solis Comer Jr.  : 1985         Chief Complaint:     Chief Complaint   Patient presents with    Follow-up     1 mo follow up, review medications. Pt states he is doing well.  Pt did not have labs done.       Anxiety    Hypertension       History of Present Illness:        HPI    Solis presents to office to follow up for BL shoulder pain  He was seen in our office on 24 for evaluation of shoulder pain and worsening Anxiety disorder and uncontrolled HTN.     He was prescribed Prednisone, Ibuprofen for presumed shoulder tendinitis.   States pain is a lot better. He tries not to overuse his shoulder. He would like to have another course of Prednisone     Solis presents as a follow up on anxiety.  Current medication for anxiety includes Prozac, Propranolol. .  Solis has been on this medication for about one month .  The medication is  being tolerated well.  Since starting the medication the symptoms of anxiety have improved.  Solis  is not in counciling.       He has a h/o HTN for many years.  It has been uncontrolled.  He reports no chest pain, headaches, palpitations, shortness of breath, no swelling in legs.  His risk factors for coronary artery disease include male gender, obesity, smoking.  He has been on losartan 50 mg daily and propranolol for the past 1 month and it still not controlling his BP.    Past Medical History:     History reviewed. No pertinent past medical history.   Reviewed all health maintenance requirements and orderedappropriate tests  Health Maintenance Due   Topic Date Due    Varicella vaccine (1 of 2 - 13+ 2-dose series) Never done    HIV screen  Never done    Hepatitis C screen  Never done    Hepatitis B vaccine (1 of 3 - 19+ 3-dose series) Never done    DTaP/Tdap/Td vaccine (1 - Tdap) Never done    Diabetes screen  Never done    Flu vaccine (1) Never done    COVID-19 Vaccine ( -  season) Never done       Past Surgical History:     Past Surgical

## 2024-09-04 ENCOUNTER — HOSPITAL ENCOUNTER (OUTPATIENT)
Age: 39
Discharge: HOME | End: 2024-09-04
Payer: COMMERCIAL

## 2024-09-04 DIAGNOSIS — F41.9 ANXIETY: ICD-10-CM

## 2024-09-04 DIAGNOSIS — I10 PRIMARY HYPERTENSION: ICD-10-CM

## 2024-09-04 DIAGNOSIS — Z13.220 LIPID SCREENING: ICD-10-CM

## 2024-09-04 LAB
ALBUMIN SERPL-MCNC: 4.6 G/DL (ref 3.5–5.2)
ALP SERPL-CCNC: 107 U/L (ref 40–129)
ALT SERPL-CCNC: 38 U/L (ref 5–41)
ANION GAP SERPL CALCULATED.3IONS-SCNC: 11 MMOL/L (ref 9–17)
AST SERPL-CCNC: 21 U/L
BILIRUB DIRECT SERPL-MCNC: <0.1 MG/DL
BILIRUB INDIRECT SERPL-MCNC: NORMAL MG/DL (ref 0–1)
BILIRUB SERPL-MCNC: 0.3 MG/DL (ref 0.3–1.2)
BUN SERPL-MCNC: 17 MG/DL (ref 6–20)
BUN/CREAT SERPL: 24 (ref 9–20)
CALCIUM SERPL-MCNC: 10 MG/DL (ref 8.6–10.4)
CHLORIDE SERPL-SCNC: 101 MMOL/L (ref 98–107)
CHOLEST SERPL-MCNC: 186 MG/DL (ref 0–199)
CHOLESTEROL/HDL RATIO: 4
CO2 SERPL-SCNC: 25 MMOL/L (ref 20–31)
CREAT SERPL-MCNC: 0.7 MG/DL (ref 0.7–1.2)
GFR, ESTIMATED: >90 ML/MIN/1.73M2
GLUCOSE SERPL-MCNC: 109 MG/DL (ref 70–99)
HDLC SERPL-MCNC: 50 MG/DL
LDLC SERPL CALC-MCNC: 107 MG/DL (ref 0–100)
POTASSIUM SERPL-SCNC: 4.2 MMOL/L (ref 3.7–5.3)
PROT SERPL-MCNC: 7.7 G/DL (ref 6.4–8.3)
SODIUM SERPL-SCNC: 137 MMOL/L (ref 135–144)
TRIGL SERPL-MCNC: 147 MG/DL
TSH SERPL DL<=0.05 MIU/L-ACNC: 4.04 UIU/ML (ref 0.3–5)
VLDLC SERPL CALC-MCNC: 29 MG/DL

## 2024-09-04 PROCEDURE — 80048 BASIC METABOLIC PNL TOTAL CA: CPT

## 2024-09-04 PROCEDURE — 84443 ASSAY THYROID STIM HORMONE: CPT

## 2024-09-04 PROCEDURE — 80076 HEPATIC FUNCTION PANEL: CPT

## 2024-09-04 PROCEDURE — 80061 LIPID PANEL: CPT

## 2024-09-04 PROCEDURE — 36415 COLL VENOUS BLD VENIPUNCTURE: CPT

## 2024-09-05 ENCOUNTER — TELEPHONE (OUTPATIENT)
Dept: FAMILY MEDICINE CLINIC | Age: 39
End: 2024-09-05

## 2024-09-05 NOTE — TELEPHONE ENCOUNTER
----- Message from Dr. Milan Wolf MD sent at 9/4/2024  6:56 PM EDT -----  Please advise the pt his test results are unremarkable  Thanks  ----- Message -----  From: Alex Urias Incoming Lab Results From [facility]  Sent: 9/4/2024   8:03 AM EDT  To: Milan Wolf MD

## 2024-09-30 DIAGNOSIS — F41.9 ANXIETY: ICD-10-CM

## 2024-09-30 DIAGNOSIS — I10 PRIMARY HYPERTENSION: ICD-10-CM

## 2024-09-30 RX ORDER — PROPRANOLOL HYDROCHLORIDE 40 MG/1
40 TABLET ORAL DAILY
Qty: 30 TABLET | Refills: 1 | Status: SHIPPED | OUTPATIENT
Start: 2024-09-30

## 2024-11-20 DIAGNOSIS — F41.9 ANXIETY: ICD-10-CM

## 2025-01-06 ENCOUNTER — HOSPITAL ENCOUNTER (EMERGENCY)
Age: 40
Discharge: HOME | End: 2025-01-06
Attending: EMERGENCY MEDICINE
Payer: COMMERCIAL

## 2025-01-06 VITALS
HEART RATE: 87 BPM | BODY MASS INDEX: 43.49 KG/M2 | WEIGHT: 261 LBS | TEMPERATURE: 98 F | OXYGEN SATURATION: 98 % | RESPIRATION RATE: 19 BRPM | HEIGHT: 65 IN | SYSTOLIC BLOOD PRESSURE: 146 MMHG | DIASTOLIC BLOOD PRESSURE: 104 MMHG

## 2025-01-06 DIAGNOSIS — M54.32 SCIATICA OF LEFT SIDE: Primary | ICD-10-CM

## 2025-01-06 PROCEDURE — 96372 THER/PROPH/DIAG INJ SC/IM: CPT

## 2025-01-06 PROCEDURE — 6360000002 HC RX W HCPCS: Performed by: EMERGENCY MEDICINE

## 2025-01-06 PROCEDURE — 99284 EMERGENCY DEPT VISIT MOD MDM: CPT

## 2025-01-06 RX ORDER — CYCLOBENZAPRINE HCL 10 MG
10 TABLET ORAL 3 TIMES DAILY PRN
Qty: 21 TABLET | Refills: 0 | Status: SHIPPED | OUTPATIENT
Start: 2025-01-06 | End: 2025-01-16

## 2025-01-06 RX ORDER — ORPHENADRINE CITRATE 30 MG/ML
60 INJECTION INTRAMUSCULAR; INTRAVENOUS ONCE
Status: COMPLETED | OUTPATIENT
Start: 2025-01-06 | End: 2025-01-06

## 2025-01-06 RX ORDER — METHYLPREDNISOLONE 4 MG/1
TABLET ORAL
Qty: 21 TABLET | Refills: 0 | Status: SHIPPED | OUTPATIENT
Start: 2025-01-06 | End: 2025-01-12

## 2025-01-06 RX ADMIN — ORPHENADRINE CITRATE 60 MG: 60 INJECTION INTRAMUSCULAR; INTRAVENOUS at 15:54

## 2025-01-06 ASSESSMENT — ENCOUNTER SYMPTOMS
COUGH: 0
SINUS PRESSURE: 0
VOMITING: 0
FACIAL SWELLING: 0
SORE THROAT: 0
WHEEZING: 0
EYE PAIN: 0
BLOOD IN STOOL: 0
SHORTNESS OF BREATH: 0
CHEST TIGHTNESS: 0
EYE DISCHARGE: 0
COLOR CHANGE: 0
TROUBLE SWALLOWING: 0
CONSTIPATION: 0
ABDOMINAL PAIN: 0
RHINORRHEA: 0
DIARRHEA: 0
NAUSEA: 0
BACK PAIN: 1
EYE REDNESS: 0

## 2025-01-06 ASSESSMENT — PAIN DESCRIPTION - DESCRIPTORS: DESCRIPTORS: SHARP;THROBBING

## 2025-01-06 ASSESSMENT — PAIN DESCRIPTION - PAIN TYPE: TYPE: ACUTE PAIN

## 2025-01-06 ASSESSMENT — PAIN DESCRIPTION - FREQUENCY: FREQUENCY: INTERMITTENT

## 2025-01-06 ASSESSMENT — PAIN DESCRIPTION - LOCATION: LOCATION: LEG;BUTTOCKS

## 2025-01-06 ASSESSMENT — PAIN - FUNCTIONAL ASSESSMENT: PAIN_FUNCTIONAL_ASSESSMENT: 0-10

## 2025-01-06 ASSESSMENT — PAIN SCALES - GENERAL: PAINLEVEL_OUTOF10: 7

## 2025-01-06 ASSESSMENT — PAIN DESCRIPTION - ORIENTATION: ORIENTATION: LEFT

## 2025-01-06 NOTE — ED PROVIDER NOTES
reactive to light.   Cardiovascular:      Rate and Rhythm: Normal rate and regular rhythm.      Heart sounds: Normal heart sounds. No murmur heard.     No friction rub. No gallop.   Pulmonary:      Effort: Pulmonary effort is normal. No respiratory distress.      Breath sounds: Normal breath sounds. No wheezing or rales.   Chest:      Chest wall: No tenderness.   Abdominal:      General: Bowel sounds are normal. There is no distension.      Palpations: Abdomen is soft. There is no mass.      Tenderness: There is no abdominal tenderness. There is no guarding or rebound.   Musculoskeletal:         General: Tenderness (Left SI joint) present. Normal range of motion.   Skin:     General: Skin is warm and dry.      Coloration: Skin is not pale.      Findings: No erythema or rash.   Neurological:      Mental Status: He is alert and oriented to person, place, and time.      Cranial Nerves: No cranial nerve deficit.      Sensory: No sensory deficit.      Motor: No weakness or abnormal muscle tone.   Psychiatric:         Behavior: Behavior normal.         Thought Content: Thought content normal.         Judgment: Judgment normal.           MEDICAL DECISION MAKIN)  Number and Complexity of Problems  Problem List This Visit:  Buttock pain, leg pain, leg numbness    Differential Diagnosis:  Sciatica    Diagnoses Considered but Do Not Suspect:  Cauda equina    Pertinent Comorbid Conditions:  Obesity    2)  Data Reviewed  Decision Rules/Scores/MIPS utilized:  None    EKG Interpretation:  None    External Documents Reviewed:  None    Imaging that is independently reviewed and interpreted by me as:  None    See more data below for the lab and radiology tests and orders.    3)  Treatment and Disposition      \"ED Course\" Notes From Epic Narrator:         PROCEDURES:  None      DATA FOR LAB AND RADIOLOGY TESTS ORDERED BELOW ARE REVIEWED BY THE ED CLINICIAN:    RADIOLOGY: All x-rays, CT, MRI, and formal ultrasound images

## 2025-01-08 ENCOUNTER — OFFICE VISIT (OUTPATIENT)
Dept: FAMILY MEDICINE CLINIC | Age: 40
End: 2025-01-08
Payer: COMMERCIAL

## 2025-01-08 DIAGNOSIS — F41.9 ANXIETY: ICD-10-CM

## 2025-01-08 DIAGNOSIS — M54.32 SCIATICA OF LEFT SIDE: Primary | ICD-10-CM

## 2025-01-08 DIAGNOSIS — I10 PRIMARY HYPERTENSION: ICD-10-CM

## 2025-01-08 PROCEDURE — 99213 OFFICE O/P EST LOW 20 MIN: CPT | Performed by: INTERNAL MEDICINE

## 2025-01-08 RX ORDER — POTASSIUM CITRATE 10 MEQ/1
10 TABLET, EXTENDED RELEASE ORAL
Qty: 90 TABLET | Refills: 3 | Status: SHIPPED | OUTPATIENT
Start: 2025-01-08

## 2025-01-08 RX ORDER — PROPRANOLOL HYDROCHLORIDE 40 MG/1
40 TABLET ORAL DAILY
Qty: 30 TABLET | Refills: 1 | Status: SHIPPED | OUTPATIENT
Start: 2025-01-08

## 2025-01-08 SDOH — ECONOMIC STABILITY: FOOD INSECURITY: WITHIN THE PAST 12 MONTHS, YOU WORRIED THAT YOUR FOOD WOULD RUN OUT BEFORE YOU GOT MONEY TO BUY MORE.: NEVER TRUE

## 2025-01-08 SDOH — ECONOMIC STABILITY: FOOD INSECURITY: WITHIN THE PAST 12 MONTHS, THE FOOD YOU BOUGHT JUST DIDN'T LAST AND YOU DIDN'T HAVE MONEY TO GET MORE.: NEVER TRUE

## 2025-01-08 ASSESSMENT — ENCOUNTER SYMPTOMS
ABDOMINAL PAIN: 0
CONSTIPATION: 0
WHEEZING: 0
COUGH: 0
BLOOD IN STOOL: 0
NAUSEA: 0
SHORTNESS OF BREATH: 0
BACK PAIN: 1
SORE THROAT: 0
ALLERGIC/IMMUNOLOGIC NEGATIVE: 1
BOWEL INCONTINENCE: 0

## 2025-01-08 ASSESSMENT — PATIENT HEALTH QUESTIONNAIRE - PHQ9
1. LITTLE INTEREST OR PLEASURE IN DOING THINGS: NOT AT ALL
SUM OF ALL RESPONSES TO PHQ QUESTIONS 1-9: 0
SUM OF ALL RESPONSES TO PHQ QUESTIONS 1-9: 0
2. FEELING DOWN, DEPRESSED OR HOPELESS: NOT AT ALL
SUM OF ALL RESPONSES TO PHQ QUESTIONS 1-9: 0
SUM OF ALL RESPONSES TO PHQ QUESTIONS 1-9: 0
SUM OF ALL RESPONSES TO PHQ9 QUESTIONS 1 & 2: 0

## 2025-01-08 NOTE — PATIENT INSTRUCTIONS
SURVEY:    You may be receiving a survey from [facility] regarding your visit today.    Please complete the survey to enable us to provide the highest quality of care to you and your family.    If you cannot score us a very good on any question, please call the office to discuss how we could have made your experience a very good one.    Thank you.  [facility]  MD Kimberlee Vera, MD Rojelio Lorenz, DO  Keith Riddle, MD Francine Foley, APRN-CNP  Fadia Duffy, Practice Manager  Corrina, CMA  Brinda, CCMA  César, CMA  Alaina, CMA  Diego, PSC   Cande, LPN  Taniya, CMA

## 2025-01-08 NOTE — PROGRESS NOTES
07:25 AM    BILITOT 0.3 09/04/2024 07:25 AM    ALKPHOS 107 09/04/2024 07:25 AM    AST 21 09/04/2024 07:25 AM    ALT 38 09/04/2024 07:25 AM     Lab Results   Component Value Date/Time    WBC 8.3 03/15/2020 10:13 AM    RBC 5.37 03/15/2020 10:13 AM    HGB 16.3 03/15/2020 10:13 AM    HCT 48.5 03/15/2020 10:13 AM    MCV 90.3 03/15/2020 10:13 AM    MCH 30.3 03/15/2020 10:13 AM    MCHC 33.5 03/15/2020 10:13 AM    RDW 13.0 03/15/2020 10:13 AM     03/15/2020 10:13 AM    MPV NOT REPORTED 03/15/2020 10:13 AM     Lab Results   Component Value Date/Time    TSH 4.04 09/04/2024 07:25 AM     Lab Results   Component Value Date/Time    CHOL 186 09/04/2024 07:25 AM     09/04/2024 07:25 AM    HDL 50 09/04/2024 07:25 AM          Assessment & Plan        Diagnosis Orders   1. Sciatica of left side   Pain is improving with NSAIDs, taper dose steroids and Flexeril. Continue same treatment, avoid lifting, pushing/pulling heavy objects. Declined PT.  He is doing beck stretching exercises. Follow up as needed.        2. Anxiety        3. Primary hypertension                        Completed Refills   Requested Prescriptions     Pending Prescriptions Disp Refills    FLUoxetine (PROZAC) 20 MG capsule 30 capsule 3     Sig: Take 1 capsule by mouth daily    potassium citrate (UROCIT-K) 10 MEQ (1080 MG) extended release tablet 90 tablet 3     Sig: Take 1 tablet by mouth 3 times daily (with meals)    propranolol (INDERAL) 40 MG tablet 30 tablet 1     Sig: Take 1 tablet by mouth daily     No follow-ups on file.   No orders of the defined types were placed in this encounter.    No orders of the defined types were placed in this encounter.        Patient Instructions     SURVEY:    You may be receiving a survey from [facility] your visit today.    Please complete the survey to enable us to provide the highest quality of care to you and your family.    If you cannot score us a very good on any question, please call the office to

## 2025-03-05 DIAGNOSIS — I10 PRIMARY HYPERTENSION: ICD-10-CM

## 2025-03-05 RX ORDER — LOSARTAN POTASSIUM AND HYDROCHLOROTHIAZIDE 12.5; 1 MG/1; MG/1
1 TABLET ORAL DAILY
Qty: 90 TABLET | Refills: 1 | Status: SHIPPED | OUTPATIENT
Start: 2025-03-05

## 2025-03-05 NOTE — TELEPHONE ENCOUNTER
Health Maintenance   Topic Date Due    Varicella vaccine (1 of 2 - 13+ 2-dose series) Never done    HIV screen  Never done    Hepatitis C screen  Never done    Hepatitis B vaccine (1 of 3 - 19+ 3-dose series) Never done    DTaP/Tdap/Td vaccine (1 - Tdap) Never done    Diabetes screen  Never done    Flu vaccine (1) Never done    COVID-19 Vaccine (1 - 2024-25 season) Never done    Depression Screen  01/08/2026    Hepatitis A vaccine  Aged Out    Hib vaccine  Aged Out    HPV vaccine  Aged Out    Polio vaccine  Aged Out    Meningococcal (ACWY) vaccine  Aged Out    Pneumococcal 0-49 years Vaccine  Aged Out             (applicable per patient's age: Cancer Screenings, Depression Screening, Fall Risk Screening, Immunizations)    AST (U/L)   Date Value   09/04/2024 21     ALT (U/L)   Date Value   09/04/2024 38     BUN (mg/dL)   Date Value   09/04/2024 17      (goal A1C is < 7)   (goal LDL is <100) need 30-50% reduction from baseline     BP Readings from Last 3 Encounters:   01/06/25 (!) 146/104   09/03/24 (!) 148/92   08/02/24 (!) 152/84    (goal /80)      All Future Testing planned in [facility]:  Lab Frequency Next Occurrence       Next Visit Date:  No future appointments.         Patient Active Problem List:     Current smoker     Inguinal hernia     Athlete's foot     Left foot pain     Other synovitis and tenosynovitis, left ankle and foot     Pain in limb     Plantar fascial fibromatosis     Stress fracture of metatarsal bone of left foot     Lumbar radiculopathy     Sciatica

## 2025-03-07 DIAGNOSIS — F41.9 ANXIETY: ICD-10-CM

## 2025-03-07 DIAGNOSIS — I10 PRIMARY HYPERTENSION: ICD-10-CM

## 2025-03-07 RX ORDER — PROPRANOLOL HYDROCHLORIDE 40 MG/1
40 TABLET ORAL DAILY
Qty: 30 TABLET | Refills: 1 | Status: SHIPPED | OUTPATIENT
Start: 2025-03-07

## 2025-05-05 DIAGNOSIS — F41.9 ANXIETY: ICD-10-CM

## 2025-05-05 DIAGNOSIS — I10 PRIMARY HYPERTENSION: ICD-10-CM

## 2025-05-05 RX ORDER — POTASSIUM CITRATE 1080 MG/1
10 TABLET, EXTENDED RELEASE ORAL
Qty: 90 TABLET | Refills: 3 | Status: SHIPPED | OUTPATIENT
Start: 2025-05-05

## 2025-05-05 RX ORDER — PROPRANOLOL HYDROCHLORIDE 40 MG/1
40 TABLET ORAL DAILY
Qty: 30 TABLET | Refills: 1 | Status: SHIPPED | OUTPATIENT
Start: 2025-05-05

## 2025-05-05 RX ORDER — LOSARTAN POTASSIUM AND HYDROCHLOROTHIAZIDE 12.5; 1 MG/1; MG/1
1 TABLET ORAL DAILY
Qty: 90 TABLET | Refills: 1 | Status: SHIPPED | OUTPATIENT
Start: 2025-05-05

## 2025-07-10 DIAGNOSIS — F41.9 ANXIETY: ICD-10-CM

## 2025-07-10 DIAGNOSIS — I10 PRIMARY HYPERTENSION: ICD-10-CM

## 2025-07-10 RX ORDER — PROPRANOLOL HYDROCHLORIDE 40 MG/1
40 TABLET ORAL DAILY
Qty: 30 TABLET | Refills: 1 | Status: SHIPPED | OUTPATIENT
Start: 2025-07-10

## 2025-07-10 RX ORDER — LOSARTAN POTASSIUM AND HYDROCHLOROTHIAZIDE 12.5; 1 MG/1; MG/1
1 TABLET ORAL DAILY
Qty: 90 TABLET | Refills: 1 | Status: SHIPPED | OUTPATIENT
Start: 2025-07-10